# Patient Record
Sex: FEMALE | Race: WHITE | NOT HISPANIC OR LATINO | Employment: FULL TIME | URBAN - METROPOLITAN AREA
[De-identification: names, ages, dates, MRNs, and addresses within clinical notes are randomized per-mention and may not be internally consistent; named-entity substitution may affect disease eponyms.]

---

## 2023-10-31 ENCOUNTER — APPOINTMENT (OUTPATIENT)
Dept: LAB | Facility: CLINIC | Age: 28
End: 2023-10-31
Payer: COMMERCIAL

## 2023-10-31 DIAGNOSIS — N92.6 MISSED MENSES: ICD-10-CM

## 2023-10-31 LAB
ABO GROUP BLD: NORMAL
B-HCG SERPL-ACNC: ABNORMAL MIU/ML (ref 0–5)
BLD GP AB SCN SERPL QL: NEGATIVE
RH BLD: POSITIVE
SPECIMEN EXPIRATION DATE: NORMAL

## 2023-10-31 PROCEDURE — 86901 BLOOD TYPING SEROLOGIC RH(D): CPT

## 2023-10-31 PROCEDURE — 84702 CHORIONIC GONADOTROPIN TEST: CPT

## 2023-10-31 PROCEDURE — 86850 RBC ANTIBODY SCREEN: CPT

## 2023-10-31 PROCEDURE — 36415 COLL VENOUS BLD VENIPUNCTURE: CPT

## 2023-10-31 PROCEDURE — 86900 BLOOD TYPING SEROLOGIC ABO: CPT

## 2023-11-01 ENCOUNTER — TELEPHONE (OUTPATIENT)
Dept: OBGYN CLINIC | Facility: MEDICAL CENTER | Age: 28
End: 2023-11-01

## 2023-11-01 DIAGNOSIS — N92.6 MISSED MENSES: Primary | ICD-10-CM

## 2023-11-01 NOTE — TELEPHONE ENCOUNTER
No appointments available in office that work with patient schedule for dating ultrasound - order placed for central scheduling

## 2023-11-15 ENCOUNTER — HOSPITAL ENCOUNTER (OUTPATIENT)
Dept: ULTRASOUND IMAGING | Facility: HOSPITAL | Age: 28
Discharge: HOME | End: 2023-11-15
Attending: OBSTETRICS & GYNECOLOGY
Payer: COMMERCIAL

## 2023-11-15 DIAGNOSIS — N92.6 MISSED MENSES: ICD-10-CM

## 2023-11-15 PROCEDURE — 76801 OB US < 14 WKS SINGLE FETUS: CPT

## 2023-11-16 ENCOUNTER — TELEPHONE (OUTPATIENT)
Dept: OBGYN CLINIC | Facility: MEDICAL CENTER | Age: 28
End: 2023-11-16

## 2023-11-16 DIAGNOSIS — N92.6 MISSED MENSES: Primary | ICD-10-CM

## 2023-11-16 DIAGNOSIS — Z34.01 ENCOUNTER FOR SUPERVISION OF NORMAL FIRST PREGNANCY IN FIRST TRIMESTER: ICD-10-CM

## 2023-11-28 ENCOUNTER — INITIAL PRENATAL (OUTPATIENT)
Dept: OBGYN CLINIC | Facility: MEDICAL CENTER | Age: 28
End: 2023-11-28

## 2023-11-28 VITALS
BODY MASS INDEX: 25.3 KG/M2 | DIASTOLIC BLOOD PRESSURE: 66 MMHG | HEIGHT: 64 IN | SYSTOLIC BLOOD PRESSURE: 106 MMHG | WEIGHT: 148.2 LBS

## 2023-11-28 DIAGNOSIS — Z34.01 ENCOUNTER FOR SUPERVISION OF NORMAL FIRST PREGNANCY IN FIRST TRIMESTER: Primary | ICD-10-CM

## 2023-11-28 PROCEDURE — NOBC

## 2023-11-28 RX ORDER — CETIRIZINE HYDROCHLORIDE 5 MG/1
5 TABLET ORAL DAILY
COMMUNITY

## 2023-11-28 NOTE — PROGRESS NOTES
OB History    Para Term  AB Living   1 0 0 0 0 0   SAB IAB Ectopic Multiple Live Births   0 0 0 0 0      # Outcome Date GA Lbr Chucho/2nd Weight Sex Delivery Anes PTL Lv   1 Current                  * Pt presents for OB intake  *  *Pt's LMP was Patient's last menstrual period was 2023. *Ultrasound date:11/15/23   8 weeks 1 days  *Estimated date of delivery: 24   * confirmed by LMP    *Signs/Symptoms of Pregnancy   *no Constipation    *no Headaches   *no Cramping  *no Spotting      Diabetes     *no Hx of GDM    *no BMI >35    *no First degree relative with type 2 diabetes    *no Hx of PCOS     Hypertension-    *no Hx of chronic HTN    *no Hx of gestational HTN   *no hx of preeclampsia, eclampsia, or HELLP syndrome     *Infection Screening   *no Does the pt have a hx of MRSA? *no History of herpes? *Immunizations:   *yes Discussed influenza vaccine- Already received it but not in chart. Will bring information to her next visit.    *yes Discussed TDaP vaccine   *yes COVID Vaccine x2    SOCIOECONOMIC:  Mental/Physical/Sexual Abuse  *no  Housing Security  *no  Food Security  *no  Special Needs/Challenges  *no  Substance Use Disorder   ETOH   *no    OPIOD   *no     THC *no    Other: no    ACTIVE MEDICAL/MENTAL HEALTH CONDITIONS  Depression *no   Anxiety*no  Medications*no    *Interview education   *Handouts given:    *[facility] support center     *Lab Locations    * [facility]    *Schedule for Prenatal Visits    *Pregnancy Warning Signs Reviewed    *Safe Medications During Pregnancy    *SMA and CF Testing information sheet    *CPT Code Sheet/MFM 13week NT pamphlet    *[employer]      SBIRT Screen:  Depression Screening Follow-up Plan: Patient's depression screening was negative with an Mattaponi score of 4.        *[facility]   *yes discussed genetic testing- pt interested   Patient has been informed of basic prenatal advice such as avoiding alcohol, drugs, and smoking. She should remain hydrated and take daily prenatal vitamins. Patient should limit caffeine, raw sprouts, high mercury fish, undercooked fish, raw eggs, organ meat, unwashed produce, and unpasteurized cheeses, milk, and fruit juice and undercooked meats. She has been informed about toxoplasmosis and to avoid cat feces. *Details that I feel the provider should be aware of:  Taz Boothe came in for her OB intake at 10 weeks. Patient does not have any current complaints. Prenatal panel ordered. Patient has NT scheduled with [facility] 12/19. SMA and CF information given. Patient will call and let us know if would like for us to place orders. PN1 visit scheduled for *12/14. The patient was oriented to our practice and all questions were answered.     Interviewed by:  Mandy Soriano RN

## 2023-12-06 ENCOUNTER — TELEPHONE (OUTPATIENT)
Dept: OBGYN CLINIC | Facility: MEDICAL CENTER | Age: 28
End: 2023-12-06

## 2023-12-06 DIAGNOSIS — Z13.228 SCREENING FOR CYSTIC FIBROSIS: Primary | ICD-10-CM

## 2023-12-06 DIAGNOSIS — Z34.01 ENCOUNTER FOR SUPERVISION OF NORMAL FIRST PREGNANCY IN FIRST TRIMESTER: ICD-10-CM

## 2023-12-12 ENCOUNTER — APPOINTMENT (OUTPATIENT)
Dept: LAB | Facility: CLINIC | Age: 28
End: 2023-12-12
Payer: COMMERCIAL

## 2023-12-12 DIAGNOSIS — Z34.01 ENCOUNTER FOR SUPERVISION OF NORMAL FIRST PREGNANCY IN FIRST TRIMESTER: ICD-10-CM

## 2023-12-12 LAB
ABO GROUP BLD: NORMAL
BACTERIA UR QL AUTO: ABNORMAL /HPF
BASOPHILS # BLD AUTO: 0.08 THOUSANDS/ÂΜL (ref 0–0.1)
BASOPHILS NFR BLD AUTO: 1 % (ref 0–1)
BILIRUB UR QL STRIP: NEGATIVE
BLD GP AB SCN SERPL QL: NEGATIVE
CLARITY UR: CLEAR
COLOR UR: COLORLESS
EOSINOPHIL # BLD AUTO: 0.15 THOUSAND/ÂΜL (ref 0–0.61)
EOSINOPHIL NFR BLD AUTO: 1 % (ref 0–6)
ERYTHROCYTE [DISTWIDTH] IN BLOOD BY AUTOMATED COUNT: 12.3 % (ref 11.6–15.1)
GLUCOSE UR STRIP-MCNC: NEGATIVE MG/DL
HBV SURFACE AB SER-ACNC: >500 MIU/ML
HBV SURFACE AG SER QL: NORMAL
HCT VFR BLD AUTO: 41 % (ref 34.8–46.1)
HCV AB SER QL: NORMAL
HGB BLD-MCNC: 13.3 G/DL (ref 11.5–15.4)
HGB UR QL STRIP.AUTO: ABNORMAL
HIV 1+2 AB+HIV1 P24 AG SERPL QL IA: NORMAL
HIV 2 AB SERPL QL IA: NORMAL
HIV1 AB SERPL QL IA: NORMAL
HIV1 P24 AG SERPL QL IA: NORMAL
IMM GRANULOCYTES # BLD AUTO: 0.09 THOUSAND/UL (ref 0–0.2)
IMM GRANULOCYTES NFR BLD AUTO: 1 % (ref 0–2)
KETONES UR STRIP-MCNC: NEGATIVE MG/DL
LEUKOCYTE ESTERASE UR QL STRIP: ABNORMAL
LYMPHOCYTES # BLD AUTO: 1.8 THOUSANDS/ÂΜL (ref 0.6–4.47)
LYMPHOCYTES NFR BLD AUTO: 16 % (ref 14–44)
MCH RBC QN AUTO: 30.1 PG (ref 26.8–34.3)
MCHC RBC AUTO-ENTMCNC: 32.4 G/DL (ref 31.4–37.4)
MCV RBC AUTO: 93 FL (ref 82–98)
MONOCYTES # BLD AUTO: 0.7 THOUSAND/ÂΜL (ref 0.17–1.22)
MONOCYTES NFR BLD AUTO: 6 % (ref 4–12)
NEUTROPHILS # BLD AUTO: 8.47 THOUSANDS/ÂΜL (ref 1.85–7.62)
NEUTS SEG NFR BLD AUTO: 75 % (ref 43–75)
NITRITE UR QL STRIP: NEGATIVE
NON-SQ EPI CELLS URNS QL MICRO: ABNORMAL /HPF
NRBC BLD AUTO-RTO: 0 /100 WBCS
PH UR STRIP.AUTO: 6 [PH]
PLATELET # BLD AUTO: 345 THOUSANDS/UL (ref 149–390)
PMV BLD AUTO: 10 FL (ref 8.9–12.7)
PROT UR STRIP-MCNC: NEGATIVE MG/DL
RBC # BLD AUTO: 4.42 MILLION/UL (ref 3.81–5.12)
RBC #/AREA URNS AUTO: ABNORMAL /HPF
RH BLD: POSITIVE
RUBV IGG SERPL IA-ACNC: 45.8 IU/ML
SP GR UR STRIP.AUTO: 1.01 (ref 1–1.03)
SPECIMEN EXPIRATION DATE: NORMAL
TREPONEMA PALLIDUM IGG+IGM AB [PRESENCE] IN SERUM OR PLASMA BY IMMUNOASSAY: NORMAL
UROBILINOGEN UR STRIP-ACNC: <2 MG/DL
WBC # BLD AUTO: 11.29 THOUSAND/UL (ref 4.31–10.16)
WBC #/AREA URNS AUTO: ABNORMAL /HPF

## 2023-12-12 PROCEDURE — 86803 HEPATITIS C AB TEST: CPT

## 2023-12-12 PROCEDURE — 87086 URINE CULTURE/COLONY COUNT: CPT

## 2023-12-12 PROCEDURE — 36415 COLL VENOUS BLD VENIPUNCTURE: CPT

## 2023-12-12 PROCEDURE — 87340 HEPATITIS B SURFACE AG IA: CPT

## 2023-12-12 PROCEDURE — 87389 HIV-1 AG W/HIV-1&-2 AB AG IA: CPT

## 2023-12-12 PROCEDURE — 86780 TREPONEMA PALLIDUM: CPT

## 2023-12-12 PROCEDURE — 86762 RUBELLA ANTIBODY: CPT

## 2023-12-12 PROCEDURE — 86900 BLOOD TYPING SEROLOGIC ABO: CPT

## 2023-12-12 PROCEDURE — 81001 URINALYSIS AUTO W/SCOPE: CPT

## 2023-12-12 PROCEDURE — 86850 RBC ANTIBODY SCREEN: CPT

## 2023-12-12 PROCEDURE — 85025 COMPLETE CBC W/AUTO DIFF WBC: CPT

## 2023-12-12 PROCEDURE — 86901 BLOOD TYPING SEROLOGIC RH(D): CPT

## 2023-12-12 PROCEDURE — 86706 HEP B SURFACE ANTIBODY: CPT

## 2023-12-12 PROCEDURE — 87147 CULTURE TYPE IMMUNOLOGIC: CPT

## 2023-12-13 ENCOUNTER — TELEPHONE (OUTPATIENT)
Dept: OBGYN CLINIC | Facility: MEDICAL CENTER | Age: 28
End: 2023-12-13

## 2023-12-13 PROBLEM — Z3A.12 12 WEEKS GESTATION OF PREGNANCY: Status: ACTIVE | Noted: 2023-12-13

## 2023-12-13 PROBLEM — R16.0 LIVER MASS: Status: RESOLVED | Noted: 2018-11-19 | Resolved: 2023-12-13

## 2023-12-13 PROBLEM — B95.1 POSITIVE GBS TEST: Status: ACTIVE | Noted: 2023-12-13

## 2023-12-13 PROBLEM — Z82.62 FHX: OSTEOPOROSIS: Status: RESOLVED | Noted: 2020-10-02 | Resolved: 2023-12-13

## 2023-12-13 NOTE — TELEPHONE ENCOUNTER
----- Message from Lou Calvin, [facility] sent at 12/13/2023 12:51 PM EST -----  Please call patient blood type is O+, antibody screen is negative, hepatitis B is negative, syphilis screening is negative, hepatitis C is negative, HIV is negative, rubella is immune, hepatitis B surface antibody shows immunity from vaccine, CBC did not show signs of anemia, urine had slight leukocytes and trace blood. Urine culture resulted with 10-19,000 of GBS will need treatment during labor. We will review all of these tomorrow morning at her appointment.     Enck you

## 2023-12-13 NOTE — PROGRESS NOTES
Prenatal H&P     Denies loss of fluid, vaginal discharge, vaginal bleeding  and abdominal pain. Not feeling fetal movement. Tolerating PNV well. Prenatal panel reviewed-WNL except for GBS colonization in urine. Will need treatment in labor. Plans for genetic screening appointment scheduled  center 23. Planned pregnancy     Has been getting hives on legs intermittently. Meets with allergist.  Has noticed hives only appear if she does not take her Zyrtec. OB history:     G1- current     Dating:     LMP - 23 CHERRIE 24     US on 11/15/23 @  8w 1d CHERRIE 24  Working CHERRIE 24     Surgical history: Tonsils and adenoids, frenulectomy and wisdom tooth    Medical History:     Nodular hyperplasia of liver, hemorrhoids and eczema    Social history:     Alcohol/ tobacco/ illicit drug social alcohol use prior to pregnancy/denies drug or tobacco use     Denies history of STD/STI     Work/ housing/ support radiology  SLRA/house-stable/ FOB, family and friends supportive     SAURABH no risk    She denies a hx of recent cough, chills, fever,  STD/STI, denies a personal history  of TB or Zika virus or close contacts with persons with TB or COVID -19. She denies a family history of inheritable conditions such as physical or intellectual disabilities, birth defects, blood disorders, heart or neural tube defects. She has never had MRSA. She denies recent travel or travel planned in the near future. Patient Plans   - Feeding pumping  - Contraception Liletta IUD  - Aware office delivers at [facility]. Reviewed depending on complaint and gestational age may recommend evaluation at [address] [place] [address]:  - Continue prenatal vitamin daily  - Genetic screening/ [facility] appointment scheduled 23  -Encouraged follow-up with allergist if hives continue  -GBS colonization urine will need treatment in labor. No known allergies  -  Weight gain in pregnancy- Who BMI recommend 15-25 lb .  Healthy diet and daily exercise reviewed and encouraged  - Unit regimen reviewed visit every 4 weeks until 28 weeks, every 2 weeks until 36 weeks and then weekly until delivery.    -Pap smear collected. Gonorrhea/chlamydia collected. - Vaccines in Pregnancy      - TDAP vaccine after 27 gestational weeks     - RSV vaccine between 32-36.6 gestational weeks. September- January      - Reviewed with patient  Pregnancy with COVID infection is considered  high risk and can have poor outcome including  delivery, more severe infection. therefore  pregnant patients are recommended to get  COVID-19 vaccination. Written information provided. Had vaccine x 2     - influenza October- March had vaccine in October. Will bring in card to update EMR  -  Common discomforts of pregnancy and precautions reviewed. Signs and symptoms to report reviewed.       RTO 4 weeks

## 2023-12-14 ENCOUNTER — INITIAL PRENATAL (OUTPATIENT)
Dept: OBGYN CLINIC | Facility: MEDICAL CENTER | Age: 28
End: 2023-12-14

## 2023-12-14 VITALS — BODY MASS INDEX: 25.25 KG/M2 | WEIGHT: 147.1 LBS | SYSTOLIC BLOOD PRESSURE: 108 MMHG | DIASTOLIC BLOOD PRESSURE: 62 MMHG

## 2023-12-14 DIAGNOSIS — Z34.91 PRENATAL CARE IN FIRST TRIMESTER: Primary | ICD-10-CM

## 2023-12-14 DIAGNOSIS — Z11.3 ROUTINE SCREENING FOR STI (SEXUALLY TRANSMITTED INFECTION): ICD-10-CM

## 2023-12-14 DIAGNOSIS — B95.1 POSITIVE GBS TEST: ICD-10-CM

## 2023-12-14 DIAGNOSIS — Z3A.12 12 WEEKS GESTATION OF PREGNANCY: ICD-10-CM

## 2023-12-14 DIAGNOSIS — Z12.4 SCREENING FOR CERVICAL CANCER: ICD-10-CM

## 2023-12-14 LAB
BACTERIA UR CULT: ABNORMAL
BACTERIA UR CULT: ABNORMAL

## 2023-12-14 PROCEDURE — G0145 SCR C/V CYTO,THINLAYER,RESCR: HCPCS | Performed by: NURSE PRACTITIONER

## 2023-12-14 PROCEDURE — 87491 CHLMYD TRACH DNA AMP PROBE: CPT | Performed by: NURSE PRACTITIONER

## 2023-12-14 PROCEDURE — 87591 N.GONORRHOEAE DNA AMP PROB: CPT | Performed by: NURSE PRACTITIONER

## 2023-12-14 PROCEDURE — G0476 HPV COMBO ASSAY CA SCREEN: HCPCS | Performed by: NURSE PRACTITIONER

## 2023-12-14 PROCEDURE — PNV: Performed by: NURSE PRACTITIONER

## 2023-12-15 LAB
C TRACH DNA SPEC QL NAA+PROBE: NEGATIVE
N GONORRHOEA DNA SPEC QL NAA+PROBE: NEGATIVE

## 2023-12-18 LAB
HPV HR 12 DNA CVX QL NAA+PROBE: NEGATIVE
HPV16 DNA CVX QL NAA+PROBE: NEGATIVE
HPV18 DNA CVX QL NAA+PROBE: NEGATIVE

## 2023-12-19 ENCOUNTER — ROUTINE PRENATAL (OUTPATIENT)
Age: 28
End: 2023-12-19
Payer: COMMERCIAL

## 2023-12-19 VITALS
HEIGHT: 64 IN | HEART RATE: 87 BPM | SYSTOLIC BLOOD PRESSURE: 106 MMHG | WEIGHT: 148.4 LBS | DIASTOLIC BLOOD PRESSURE: 68 MMHG | BODY MASS INDEX: 25.33 KG/M2

## 2023-12-19 DIAGNOSIS — Z3A.13 13 WEEKS GESTATION OF PREGNANCY: Primary | ICD-10-CM

## 2023-12-19 DIAGNOSIS — Z34.01 ENCOUNTER FOR SUPERVISION OF NORMAL FIRST PREGNANCY IN FIRST TRIMESTER: ICD-10-CM

## 2023-12-19 DIAGNOSIS — Z36.82 NUCHAL TRANSLUCENCY OF FETUS ON PRENATAL ULTRASOUND: ICD-10-CM

## 2023-12-19 PROCEDURE — 99243 OFF/OP CNSLTJ NEW/EST LOW 30: CPT | Performed by: OBSTETRICS & GYNECOLOGY

## 2023-12-19 PROCEDURE — 76813 OB US NUCHAL MEAS 1 GEST: CPT | Performed by: OBSTETRICS & GYNECOLOGY

## 2023-12-19 PROCEDURE — 36415 COLL VENOUS BLD VENIPUNCTURE: CPT | Performed by: OBSTETRICS & GYNECOLOGY

## 2023-12-19 NOTE — PROGRESS NOTES
"Patient chose to have [employer] Non-invasive Prenatal Screen with fetal sex (per pt request).   Patient choose billed through insurance.   Patient assistance program (PAP) application provided to patient yes.  PAP sent with specimen No.     Patient given brochure and is aware [employer] will contact their insurance and coordinate coverage.  Patient made aware she will receive a text message and e-mail from [employer].   Patient informed text/phone call message will come from area code  \"415\".  Provided [employer] Client Services # [phone] and web site at [email or web address].   \"Orange your test online\" card with barcode and test tube ID provided to patient.   Reviewed [employer]'s web site states 5-7 business days for results via their portal.   [employer] message will be sent to patient when [facility] receives results /provider reviews.    2 vials of blood drawn from  right arm by A aSda WATKINS.   Patient tolerated blood draw without difficulty.  Specimens labeled with patient identifiers (name, date of birth, specimen collection date), order and specimen were verified with patient, packed and sent via [employer] lab .  FED EX  tracking #  [id number]  Copy of lab order scanned to Epic media.    Maternal Fetal Medicine will have results in approximately 7-10 business days and will call patient or notify via [facility].  Patient aware viewing lab result online will reveal fetal sex if ordered.    Patient verbalized understanding of all instructions and no questions at this time.    "

## 2023-12-19 NOTE — PROGRESS NOTES
OFFICE CONSULT  Referring physician:   Becca Duke Md  [address] [place] [address]  [address] [place] [address]      Dear Dr. Duke      Thank you for requesting a  consultation on your patient Ms. Eva Garduno for the following indications:  Genetic screening    History  Medications: Zyrtec, Benadryl, epinephrine and prenatal vitamins  Allergies to medications: None  Past medical history: Incidental diagnosis of focal nodular hyperplasia of the liver that is asymptomatic  Past surgical history: Tonsillectomy frenulectomy and wisdom tooth removal  Past obstetrical history:  1 para 0  Social history: Reports no substance use in pregnancy  First generation family history: Her sister and father has hypertension.  Her sister has never been pregnant.    Ultrasound findings:  The ultrasound shows a fetus concordant with dates. The nasal bone and nuchal translucency appears normal. No malformations are seen on today's early ultrasound.     The patient was informed of the findings and counseled about the limitations of the exam in detecting all forms of fetal congenital abnormalities.    She does not report any vaginal bleeding or uterine cramping or contractions.      Specific counseling was provided on the following problems:  We discussed the options for genetic screening which include invasive testing on the fetal placenta or on fetal skin cells within the amniotic fluid and compared this to noninvasive testing which includes cell free DNA screening.  We reviewed the risks, the benefits and the limitations of each.  In the end patient chose to complete the cell free DNA screen.          Future tests recommended:  The results of her NIPT will return in 7-10 days.  Screening for spina bifida with an MSAFP screen is a future test that can be prescribed through her OB office.  This blood work should be drawn preferably at 16 to 18 weeks so that the results return prior to her next scan.  The test  though can be run until 21 weeks and 6 days if needed.    Future ultrasounds ordered today:   Fetal Level II ultrasound imaging is recommended at 19-20 weeks' gestation.    Pre visit time reviewing her records   5 minutes  Face to face time 5 minutes  Post visit time on documentation of note, updating her problem list, adding orders and prescriptions 10 minutes.  Procedures that were completed today were charged separately.   The level of decision making was straight forward.    Zenaida Marcus MD

## 2023-12-19 NOTE — LETTER
2023     Becca Duke MD  [address] [place] [id number]    Patient: Eva Garduno   YOB: 1995   Date of Visit: 2023       Dear Dr. Duke:    Thank you for referring Eva Garduno to me for evaluation. Below are my notes for this consultation.    If you have questions, please do not hesitate to call me. I look forward to following your patient along with you.         Sincerely,        Zenaida Marcus MD        CC: No Recipients    Zenaida Marcus MD  2023  6:49 PM  Sign when Signing Visit  OFFICE CONSULT  Referring physician:   Becca Duke Md  [address] [place] [address]      Dear Dr. Duke      Thank you for requesting a  consultation on your patient Ms. Eva Garduno for the following indications:  Genetic screening    History  Medications: Zyrtec, Benadryl, epinephrine and prenatal vitamins  Allergies to medications: None  Past medical history: Incidental diagnosis of focal nodular hyperplasia of the liver that is asymptomatic  Past surgical history: Tonsillectomy frenulectomy and wisdom tooth removal  Past obstetrical history:  1 para 0  Social history: Reports no substance use in pregnancy  First generation family history: Her sister and father has hypertension.  Her sister has never been pregnant.    Ultrasound findings:  The ultrasound shows a fetus concordant with dates. The nasal bone and nuchal translucency appears normal. No malformations are seen on today's early ultrasound.     The patient was informed of the findings and counseled about the limitations of the exam in detecting all forms of fetal congenital abnormalities.    She does not report any vaginal bleeding or uterine cramping or contractions.      Specific counseling was provided on the following problems:  We discussed the options for genetic screening which include invasive testing on the fetal placenta or on fetal  "skin cells within the amniotic fluid and compared this to noninvasive testing which includes cell free DNA screening.  We reviewed the risks, the benefits and the limitations of each.  In the end patient chose to complete the cell free DNA screen.          Future tests recommended:  The results of her NIPT will return in 7-10 days.  Screening for spina bifida with an MSAFP screen is a future test that can be prescribed through her OB office.  This blood work should be drawn preferably at 16 to 18 weeks so that the results return prior to her next scan.  The test though can be run until 21 weeks and 6 days if needed.    Future ultrasounds ordered today:   Fetal Level II ultrasound imaging is recommended at 19-20 weeks' gestation.    Pre visit time reviewing her records   5 minutes  Face to face time 5 minutes  Post visit time on documentation of note, updating her problem list, adding orders and prescriptions 10 minutes.  Procedures that were completed today were charged separately.   The level of decision making was straight forward.    MD eHrnando Bach  12/19/2023  9:17 AM  Sign when Signing Visit  Patient chose to have [employer] Non-invasive Prenatal Screen with fetal sex (per pt request).   Patient choose billed through insurance.   Patient assistance program (PAP) application provided to patient yes.  PAP sent with specimen No.     Patient given brochure and is aware [employer] will contact their insurance and coordinate coverage.  Patient made aware she will receive a text message and e-mail from [employer].   Patient informed text/phone call message will come from area code  \"415\".  Provided [employer] Client Services # [phone] and web site at [email or web address].   \"[place] your test online\" card with barcode and test tube ID provided to patient.   Reviewed [employer]'s web site states 5-7 business days for results via their portal.   [employer] message will be sent to patient when MFM " receives results /provider reviews.    2 vials of blood drawn from  right arm by FERNANDA Tomlin RN.   Patient tolerated blood draw without difficulty.  Specimens labeled with patient identifiers (name, date of birth, specimen collection date), order and specimen were verified with patient, packed and sent via [employer] lab .  FED EX  tracking #  [id number]  Copy of lab order scanned to Epic media.    Maternal Fetal Medicine will have results in approximately 7-10 business days and will call patient or notify via [employer].  Patient aware viewing lab result online will reveal fetal sex if ordered.    Patient verbalized understanding of all instructions and no questions at this time.

## 2023-12-22 LAB
LAB AP GYN PRIMARY INTERPRETATION: NORMAL
LAB AP LMP: NORMAL
Lab: NORMAL

## 2023-12-26 ENCOUNTER — APPOINTMENT (OUTPATIENT)
Dept: LAB | Facility: CLINIC | Age: 28
End: 2023-12-26
Payer: COMMERCIAL

## 2023-12-26 ENCOUNTER — PATIENT MESSAGE (OUTPATIENT)
Dept: OBGYN CLINIC | Facility: MEDICAL CENTER | Age: 28
End: 2023-12-26

## 2023-12-26 DIAGNOSIS — J30.2 SEASONAL ALLERGIC RHINITIS, UNSPECIFIED TRIGGER: ICD-10-CM

## 2023-12-26 DIAGNOSIS — R39.9 UTI SYMPTOMS: ICD-10-CM

## 2023-12-26 DIAGNOSIS — R39.9 UTI SYMPTOMS: Primary | ICD-10-CM

## 2023-12-26 LAB
BACTERIA UR QL AUTO: ABNORMAL /HPF
BILIRUB UR QL STRIP: NEGATIVE
CLARITY UR: ABNORMAL
COLOR UR: YELLOW
GLUCOSE UR STRIP-MCNC: NEGATIVE MG/DL
HGB UR QL STRIP.AUTO: ABNORMAL
KETONES UR STRIP-MCNC: NEGATIVE MG/DL
LEUKOCYTE ESTERASE UR QL STRIP: ABNORMAL
MUCOUS THREADS UR QL AUTO: ABNORMAL
NITRITE UR QL STRIP: NEGATIVE
NON-SQ EPI CELLS URNS QL MICRO: ABNORMAL /HPF
PH UR STRIP.AUTO: 6 [PH]
PROT UR STRIP-MCNC: ABNORMAL MG/DL
RBC #/AREA URNS AUTO: ABNORMAL /HPF
SP GR UR STRIP.AUTO: 1.03 (ref 1–1.03)
UROBILINOGEN UR STRIP-ACNC: <2 MG/DL
WBC #/AREA URNS AUTO: ABNORMAL /HPF

## 2023-12-26 PROCEDURE — 87086 URINE CULTURE/COLONY COUNT: CPT

## 2023-12-26 PROCEDURE — 81001 URINALYSIS AUTO W/SCOPE: CPT | Performed by: NURSE PRACTITIONER

## 2023-12-27 ENCOUNTER — PATIENT MESSAGE (OUTPATIENT)
Dept: OBGYN CLINIC | Facility: MEDICAL CENTER | Age: 28
End: 2023-12-27

## 2023-12-27 DIAGNOSIS — R39.9 URINARY SYMPTOM OR SIGN: ICD-10-CM

## 2023-12-27 DIAGNOSIS — R39.9 URINARY SYMPTOM OR SIGN: Primary | ICD-10-CM

## 2023-12-27 DIAGNOSIS — Z3A.14 14 WEEKS GESTATION OF PREGNANCY: ICD-10-CM

## 2023-12-27 LAB — BACTERIA UR CULT: NORMAL

## 2023-12-27 RX ORDER — AMOXICILLIN 500 MG/1
500 CAPSULE ORAL EVERY 8 HOURS SCHEDULED
Qty: 15 CAPSULE | Refills: 0 | Status: SHIPPED | OUTPATIENT
Start: 2023-12-27 | End: 2023-12-27 | Stop reason: ALTCHOICE

## 2023-12-29 ENCOUNTER — APPOINTMENT (OUTPATIENT)
Dept: LAB | Facility: CLINIC | Age: 28
End: 2023-12-29
Payer: COMMERCIAL

## 2023-12-29 DIAGNOSIS — Z34.01 ENCOUNTER FOR SUPERVISION OF NORMAL FIRST PREGNANCY IN FIRST TRIMESTER: ICD-10-CM

## 2023-12-29 PROCEDURE — 36415 COLL VENOUS BLD VENIPUNCTURE: CPT

## 2023-12-29 PROCEDURE — 81220 CFTR GENE COM VARIANTS: CPT

## 2023-12-29 PROCEDURE — 81329 SMN1 GENE DOS/DELETION ALYS: CPT

## 2024-01-08 LAB
CITATION REF LAB TEST: NORMAL
CLINICAL INFO: NORMAL
ETHNIC BACKGROUND STATED: NORMAL
GENE DIS ANL CARRIER INTERP-IMP: NORMAL
GENE MUT TESTED BLD/T: NORMAL
LAB DIRECTOR NAME PROVIDER: NORMAL
REASON FOR REFERRAL (NARRATIVE): NORMAL
RECOMMENDATION PATIENT DOC-IMP: NORMAL
REF LAB TEST METHOD: NORMAL
SERVICE CMNT-IMP: NORMAL
SMN1 GENE MUT ANL BLD/T: NORMAL
SPECIMEN SOURCE: NORMAL

## 2024-01-10 LAB
CF COMMENT: NORMAL
CFTR MUT ANL BLD/T: NORMAL

## 2024-01-11 ENCOUNTER — ROUTINE PRENATAL (OUTPATIENT)
Dept: OBGYN CLINIC | Facility: MEDICAL CENTER | Age: 29
End: 2024-01-11

## 2024-01-11 VITALS — SYSTOLIC BLOOD PRESSURE: 108 MMHG | DIASTOLIC BLOOD PRESSURE: 68 MMHG | BODY MASS INDEX: 25.75 KG/M2 | WEIGHT: 150 LBS

## 2024-01-11 DIAGNOSIS — Z3A.16 16 WEEKS GESTATION OF PREGNANCY: ICD-10-CM

## 2024-01-11 DIAGNOSIS — Z34.02 ENCOUNTER FOR SUPERVISION OF LOW-RISK FIRST PREGNANCY IN SECOND TRIMESTER: Primary | ICD-10-CM

## 2024-01-11 PROCEDURE — PNV: Performed by: STUDENT IN AN ORGANIZED HEALTH CARE EDUCATION/TRAINING PROGRAM

## 2024-01-11 NOTE — PROGRESS NOTES
Routine Prenatal Visit  [facility] [address], [place], [place]    Assessment/Plan:  Eva is a 28 y.o. year old  at 16w2d who presents for routine prenatal visit.     1. Encounter for supervision of low-risk first pregnancy in second trimester    2. 16 weeks gestation of pregnancy  -     Alpha fetoprotein, maternal; Future          Subjective:     CC: Prenatal care    Eva Garduno is a 28 y.o.  female who presents for routine prenatal care at 16w2d.  Pregnancy ROS: Denies leakage of fluid, pelvic pain, or vaginal bleeding.      The following portions of the patient's history were reviewed and updated as appropriate: allergies, current medications, past family history, past medical history, obstetric history, gynecologic history, past social history, past surgical history and problem list.      Objective:  /68   Wt 68 kg (150 lb)   LMP 2023   BMI 25.75 kg/m²   Pregravid Weight/BMI: 64.4 kg (142 lb) (BMI 24.36)  Current Weight: 68 kg (150 lb)   Total Weight Gain: 3.629 kg (8 lb)   Pre- Vitals      Flowsheet Row Most Recent Value   Prenatal Assessment    Fetal Heart Rate 154   Movement Absent   Prenatal Vitals    Blood Pressure 108/68   Weight - Scale 68 kg (150 lb)   Urine Albumin/Glucose    Dilation/Effacement/Station    Vaginal Drainage    Draining Fluid No   Edema    LLE Edema None   RLE Edema None   Facial Edema None             General: Well appearing, no distress  Respiratory: Unlabored breathing  Cardiovascular: Regular rate.  Abdomen: Soft, gravid, nontender  Fundal Height: Appropriate for gestational age.  Extremities: Warm and well perfused.  Non tender.

## 2024-01-30 ENCOUNTER — APPOINTMENT (OUTPATIENT)
Dept: LAB | Facility: CLINIC | Age: 29
End: 2024-01-30
Payer: COMMERCIAL

## 2024-01-30 DIAGNOSIS — Z3A.16 16 WEEKS GESTATION OF PREGNANCY: ICD-10-CM

## 2024-01-30 PROCEDURE — 82105 ALPHA-FETOPROTEIN SERUM: CPT

## 2024-01-30 PROCEDURE — 36415 COLL VENOUS BLD VENIPUNCTURE: CPT

## 2024-02-02 LAB
2ND TRIMESTER 4 SCREEN SERPL-IMP: NORMAL
AFP ADJ MOM SERPL: 0.76
AFP INTERP AMN-IMP: NORMAL
AFP INTERP SERPL-IMP: NORMAL
AFP INTERP SERPL-IMP: NORMAL
AFP SERPL-MCNC: 38.7 NG/ML
AGE AT DELIVERY: 31.3 YR
GA METHOD: NORMAL
GA: 19 WEEKS
IDDM PATIENT QL: NO
MULTIPLE PREGNANCY: NO
NEURAL TUBE DEFECT RISK FETUS: NORMAL %

## 2024-02-06 ENCOUNTER — ROUTINE PRENATAL (OUTPATIENT)
Age: 29
End: 2024-02-06
Payer: COMMERCIAL

## 2024-02-06 VITALS
SYSTOLIC BLOOD PRESSURE: 106 MMHG | DIASTOLIC BLOOD PRESSURE: 64 MMHG | HEART RATE: 88 BPM | HEIGHT: 64 IN | WEIGHT: 156 LBS | BODY MASS INDEX: 26.63 KG/M2

## 2024-02-06 DIAGNOSIS — Z3A.20 20 WEEKS GESTATION OF PREGNANCY: Primary | ICD-10-CM

## 2024-02-06 DIAGNOSIS — O44.42 LOW LYING PLACENTA NOS OR WITHOUT HEMORRHAGE, SECOND TRIMESTER: ICD-10-CM

## 2024-02-06 PROCEDURE — 76811 OB US DETAILED SNGL FETUS: CPT | Performed by: OBSTETRICS & GYNECOLOGY

## 2024-02-06 PROCEDURE — 76817 TRANSVAGINAL US OBSTETRIC: CPT | Performed by: OBSTETRICS & GYNECOLOGY

## 2024-02-06 NOTE — LETTER
February 6, 2024     Becca Duke MD  [address] [place] [address]  [address] [place] [address]    Patient: Eva Garduno   YOB: 1995   Date of Visit: 2/6/2024       Dear Dr. Duke:    Thank you for referring Eva Garduno to me for evaluation. Below are my notes for this consultation.    If you have questions, please do not hesitate to call me. I look forward to following your patient along with you.         Sincerely,        James Saavedra MD        CC: No Recipients    James Saavedra MD  2/6/2024  1:00 PM  Sign when Signing Visit  A fetal ultrasound was completed. See Ob procedures in Epic for an interpretation and recommendations. Do not hesitate to contact us in [facility] if you have questions.    James Saavedra MD, MSCE  Maternal Fetal Medicine

## 2024-02-07 ENCOUNTER — ROUTINE PRENATAL (OUTPATIENT)
Dept: OBGYN CLINIC | Facility: MEDICAL CENTER | Age: 29
End: 2024-02-07

## 2024-02-07 VITALS — DIASTOLIC BLOOD PRESSURE: 60 MMHG | WEIGHT: 158.9 LBS | SYSTOLIC BLOOD PRESSURE: 108 MMHG | BODY MASS INDEX: 27.28 KG/M2

## 2024-02-07 DIAGNOSIS — Z3A.20 20 WEEKS GESTATION OF PREGNANCY: ICD-10-CM

## 2024-02-07 DIAGNOSIS — O44.42 LOW LYING PLACENTA NOS OR WITHOUT HEMORRHAGE, SECOND TRIMESTER: Primary | ICD-10-CM

## 2024-02-07 PROCEDURE — PNV: Performed by: NURSE PRACTITIONER

## 2024-02-07 NOTE — PROGRESS NOTES
Denies loss of fluid, vaginal bleeding and abdominal pain.  Confirms frequent fetal movement.  Tolerating prenatal vitamin well.  Is having some upper back/neck soreness from weight of breasts.  Denies other concerns   center ultrasound reviewed-breech presentation, normal-appearing fetal growth, posterior/low-lying placenta, EPI-WNL and EFW 12 ounces.  Recommendation for follow-up at 32 weeks for placental location and growth  BP: 108/60  Wt + 16lbs  Plan   -continue prenatal vitamins daily  -Follow-up with  center scheduled for 24-low-lying placenta.  Precautions reviewed  - upper back/neck soreness encouraged stretching and exercises.  Encouraged to wear well-fitting bra.  If no improvement can offer physical therapy  -Common discomforts of pregnancy and precautions reviewed.  Signs and symptoms to report reviewed.  RTO 4 weeks f/u back/ neck pain

## 2024-03-05 ENCOUNTER — ROUTINE PRENATAL (OUTPATIENT)
Dept: OBGYN CLINIC | Facility: MEDICAL CENTER | Age: 29
End: 2024-03-05

## 2024-03-05 VITALS — SYSTOLIC BLOOD PRESSURE: 110 MMHG | DIASTOLIC BLOOD PRESSURE: 60 MMHG | WEIGHT: 161 LBS | BODY MASS INDEX: 27.64 KG/M2

## 2024-03-05 DIAGNOSIS — Z34.92 SECOND TRIMESTER PREGNANCY: ICD-10-CM

## 2024-03-05 DIAGNOSIS — O44.42 LOW LYING PLACENTA NOS OR WITHOUT HEMORRHAGE, SECOND TRIMESTER: ICD-10-CM

## 2024-03-05 DIAGNOSIS — Z3A.24 24 WEEKS GESTATION OF PREGNANCY: Primary | ICD-10-CM

## 2024-03-05 PROCEDURE — PNV: Performed by: OBSTETRICS & GYNECOLOGY

## 2024-03-05 NOTE — PROGRESS NOTES
Eva is a 29 y.o. year old  at 24w0d for routine prenatal visit.   + FM, no vaginal bleeding, contractions, or LOF  Complaints: No   Some neck discomfort from breast growth in pregnancy - supportive measures discussed    Most recent ultrasound and labs reviewed.  Order for 28 week labs given   Has follow up @ [facility] @  32 weeks - for  low lying posterior placenta

## 2024-03-16 ENCOUNTER — APPOINTMENT (OUTPATIENT)
Dept: LAB | Facility: CLINIC | Age: 29
End: 2024-03-16
Payer: COMMERCIAL

## 2024-03-16 DIAGNOSIS — Z34.92 SECOND TRIMESTER PREGNANCY: ICD-10-CM

## 2024-03-16 LAB
ALBUMIN SERPL BCP-MCNC: 3.8 G/DL (ref 3.5–5)
ALP SERPL-CCNC: 58 U/L (ref 34–104)
ALT SERPL W P-5'-P-CCNC: 17 U/L (ref 7–52)
ANION GAP SERPL CALCULATED.3IONS-SCNC: 8 MMOL/L (ref 4–13)
AST SERPL W P-5'-P-CCNC: 17 U/L (ref 13–39)
BILIRUB SERPL-MCNC: 0.34 MG/DL (ref 0.2–1)
BUN SERPL-MCNC: 9 MG/DL (ref 5–25)
CALCIUM SERPL-MCNC: 9 MG/DL (ref 8.4–10.2)
CHLORIDE SERPL-SCNC: 105 MMOL/L (ref 96–108)
CO2 SERPL-SCNC: 23 MMOL/L (ref 21–32)
CREAT SERPL-MCNC: 0.49 MG/DL (ref 0.6–1.3)
GFR SERPL CREATININE-BSD FRML MDRD: 130 ML/MIN/1.73SQ M
GLUCOSE 1H P 50 G GLC PO SERPL-MCNC: 90 MG/DL (ref 70–134)
GLUCOSE SERPL-MCNC: 88 MG/DL (ref 65–140)
POTASSIUM SERPL-SCNC: 3.5 MMOL/L (ref 3.5–5.3)
PROT SERPL-MCNC: 6.3 G/DL (ref 6.4–8.4)
SODIUM SERPL-SCNC: 136 MMOL/L (ref 135–147)
TREPONEMA PALLIDUM IGG+IGM AB [PRESENCE] IN SERUM OR PLASMA BY IMMUNOASSAY: NORMAL

## 2024-03-16 PROCEDURE — 36415 COLL VENOUS BLD VENIPUNCTURE: CPT

## 2024-03-16 PROCEDURE — 82950 GLUCOSE TEST: CPT | Performed by: OBSTETRICS & GYNECOLOGY

## 2024-03-16 PROCEDURE — 86780 TREPONEMA PALLIDUM: CPT

## 2024-03-19 ENCOUNTER — TELEPHONE (OUTPATIENT)
Dept: INTERNAL MEDICINE CLINIC | Facility: CLINIC | Age: 29
End: 2024-03-19

## 2024-03-19 NOTE — TELEPHONE ENCOUNTER
----- Message from Obed Gage DO sent at 3/18/2024 12:50 PM EDT -----  BW was completed and came to PCP office.  Vitamin D is a bit low at 25, please advise Eva to discuss these results with her OB/Gyn doctor.  thanks

## 2024-04-01 ENCOUNTER — NURSE TRIAGE (OUTPATIENT)
Age: 29
End: 2024-04-01

## 2024-04-01 ENCOUNTER — HOSPITAL ENCOUNTER (OUTPATIENT)
Facility: HOSPITAL | Age: 29
Discharge: HOME | End: 2024-04-01
Attending: OBSTETRICS & GYNECOLOGY | Admitting: OBSTETRICS & GYNECOLOGY
Payer: COMMERCIAL

## 2024-04-01 VITALS — SYSTOLIC BLOOD PRESSURE: 110 MMHG | DIASTOLIC BLOOD PRESSURE: 69 MMHG

## 2024-04-01 PROBLEM — O36.8120 DECREASED FETAL MOVEMENTS IN SECOND TRIMESTER: Status: ACTIVE | Noted: 2024-04-01

## 2024-04-01 PROBLEM — Z3A.27 27 WEEKS GESTATION OF PREGNANCY: Status: ACTIVE | Noted: 2023-12-13

## 2024-04-01 PROCEDURE — NC001 PR NO CHARGE: Performed by: OBSTETRICS & GYNECOLOGY

## 2024-04-01 PROCEDURE — 76815 OB US LIMITED FETUS(S): CPT | Performed by: OBSTETRICS & GYNECOLOGY

## 2024-04-01 PROCEDURE — 76815 OB US LIMITED FETUS(S): CPT

## 2024-04-01 PROCEDURE — 99203 OFFICE O/P NEW LOW 30 MIN: CPT

## 2024-04-01 NOTE — PROGRESS NOTES
L&D Triage Note - OB/GYN  Eva Garduno 29 y.o. female MRN: [id number]  Unit/Bed#:  TRIAGE 3-01 Encounter: [id number]    Patient is seen by [facility]    ASSESSMENT  Eva Garduno is a 29 y.o.  at 27w6d presenting for decreased fetal movement with return of normal fetal movement in triage    PLAN  #1. Decreased fetal movement:  NST appropriate for gestational age  Dooling with no contractions  EPI 6.31 cm cm    Discharge instructions  Patient instructed to call if experiencing worsening contractions, vaginal bleeding, loss of fluid or decreased fetal movement.  Will follow up with OBGYN on 24.    D/w Dr. Altamirano  ______________    SUBJECTIVE    CHERRIE: Estimated Date of Delivery: 24    HPI:  29 y.o.  27w6d presents with complaint of decreased fetal movement.  While in L&D triage, she had return of normal fetal movement after experiencing decreased fetal movement all morning.  She denies cramping/contractions, leaking fluid, or vaginal bleeding.    Her obstetrical history is significant for GBS positive status, low-lying placenta    ROS:  Constitutional: Negative  Respiratory: Negative  Cardiovascular: Negative    Gastrointestinal: Negative    OBJECTIVE:    Vitals:  /69   LMP 2023   There is no height or weight on file to calculate BMI.    Physical Exam  Constitutional:       General: She is not in acute distress.     Appearance: Normal appearance. She is not ill-appearing.   HENT:      Mouth/Throat:      Mouth: Mucous membranes are moist.   Eyes:      Extraocular Movements: Extraocular movements intact.   Cardiovascular:      Rate and Rhythm: Normal rate.   Pulmonary:      Effort: Pulmonary effort is normal.   Abdominal:      General: There is no distension.      Palpations: Abdomen is soft.      Tenderness: There is no abdominal tenderness. There is no guarding.   Musculoskeletal:         General: No swelling or tenderness.      Right lower leg: No edema.      Left lower leg: No edema.    Skin:     General: Skin is warm and dry.      Coloration: Skin is not jaundiced.   Neurological:      General: No focal deficit present.      Mental Status: She is alert.   Psychiatric:         Mood and Affect: Mood normal.         Behavior: Behavior normal.         Thought Content: Thought content normal.         Judgment: Judgment normal.       SVE:  Declined    FHT:  Baseline: 145 bpm  Variability: moderate  Accelerations: present  Decelerations: absent  NST appropriate for gestational age    TOCO:   No contractions    IMAGING:      TAUS   EPI      - LVP 6.31 cm   Placenta: posterior   Presentation: cephalic    Labs: No results found for this or any previous visit (from the past 24 hour(s)).        Gabriela Parks MD  4/1/2024  1:35 PM

## 2024-04-01 NOTE — TELEPHONE ENCOUNTER
"Pt called in reporting decreased fetal movement today. Pt reports she last felt baby move around 0700 and it was maybe one kick. States she had lunch about 1.5 hours ago which normally makes baby move and she has felt nothing. Denies abdominal pain, vaginal bleeding or leakage of fluid. Recommended pt be evaluated in L&D and pt is agreeable.     Reason for Disposition   Pregnant 23 or more weeks and baby moving less today by kick count (e.g., kick count < 5 in 1 hour or < 10 in 2 hours)    Answer Assessment - Initial Assessment Questions  1. FETAL MOVEMENT: \"Has the baby's movement decreased or changed significantly from normal?\" (e.g., yes, no; describe)      Yes, only felt one kick around 0700  2. CHERRIE: \"What date are you expecting to deliver?\"       6/25/24  3. PREGNANCY: \"How many weeks pregnant are you?\"       27w6d  4. OTHER SYMPTOMS: \"Do you have any other symptoms?\" (e.g., abdominal pain, leaking fluid from vagina, vaginal bleeding, etc.)      denies    Protocols used: Pregnancy - Decreased Fetal Movement-ADULT-[place]    "

## 2024-04-01 NOTE — PROCEDURES
Eva Garduno, a  at 27w6d with an CHERRIE of 2024, by Last Menstrual Period, was seen at [facility] for the following procedure(s): $Procedure Type: EPI]         4 Quadrant EPI  LVP (cm): 6.3 cm  Cephalic presentation  Posterior placenta                   Gabriela Parks MD  OBGYN Resident  24  2:30 PM

## 2024-04-02 ENCOUNTER — ROUTINE PRENATAL (OUTPATIENT)
Dept: OBGYN CLINIC | Facility: MEDICAL CENTER | Age: 29
End: 2024-04-02
Payer: COMMERCIAL

## 2024-04-02 VITALS — SYSTOLIC BLOOD PRESSURE: 100 MMHG | WEIGHT: 169 LBS | DIASTOLIC BLOOD PRESSURE: 70 MMHG | BODY MASS INDEX: 29.01 KG/M2

## 2024-04-02 DIAGNOSIS — Z23 ENCOUNTER FOR IMMUNIZATION: ICD-10-CM

## 2024-04-02 DIAGNOSIS — B95.1 POSITIVE GBS TEST: ICD-10-CM

## 2024-04-02 DIAGNOSIS — Z3A.28 28 WEEKS GESTATION OF PREGNANCY: ICD-10-CM

## 2024-04-02 DIAGNOSIS — Z34.03 ENCOUNTER FOR SUPERVISION OF NORMAL FIRST PREGNANCY IN THIRD TRIMESTER: Primary | ICD-10-CM

## 2024-04-02 DIAGNOSIS — O44.42 LOW LYING PLACENTA NOS OR WITHOUT HEMORRHAGE, SECOND TRIMESTER: ICD-10-CM

## 2024-04-02 PROCEDURE — 90471 IMMUNIZATION ADMIN: CPT | Performed by: OBSTETRICS & GYNECOLOGY

## 2024-04-02 PROCEDURE — 90715 TDAP VACCINE 7 YRS/> IM: CPT | Performed by: OBSTETRICS & GYNECOLOGY

## 2024-04-02 PROCEDURE — PNV: Performed by: OBSTETRICS & GYNECOLOGY

## 2024-04-02 NOTE — PROGRESS NOTES
Routine Prenatal Visit  [facility] [address], [place], [place]    Assessment/Plan:  Eva is a 29 y.o. year old  at 28w0d who presents for routine prenatal visit.     1. Encounter for supervision of normal first pregnancy in third trimester    2. Encounter for immunization  -     Tdap Vaccine greater than or equal to 6yo    3. Low lying placenta nos or without hemorrhage, second trimester    4. 28 weeks gestation of pregnancy    5. Positive GBS test          Subjective:     CC: Prenatal care    Eva Garduno is a 29 y.o.  female who presents for routine prenatal care at 28w0d.  Pregnancy ROS: no leakage of fluid, pelvic pain, or vaginal bleeding.  + fetal movement.  - PTL precautions  - C teaching  - Birth plan given, peds list given, birth consent signed  - 28 wk labs reviewed  - Tdap given, recommendations reviewed for family vaccination  - Rhogam not indicated  - breast pump ordered    The following portions of the patient's history were reviewed and updated as appropriate: allergies, current medications, past family history, past medical history, obstetric history, gynecologic history, past social history, past surgical history and problem list.      Objective:  /70   Wt 76.7 kg (169 lb)   LMP 2023   BMI 29.01 kg/m²   Pregravid Weight/BMI: 64.4 kg (142 lb) (BMI 24.36)  Current Weight: 76.7 kg (169 lb)   Total Weight Gain: 12.2 kg (27 lb)   Pre-Tenisha Vitals      Flowsheet Row Most Recent Value   Prenatal Assessment    Fetal Heart Rate 159   Movement Present   Prenatal Vitals    Blood Pressure 100/70   Weight - Scale 76.7 kg (169 lb)   Urine Albumin/Glucose    Dilation/Effacement/Station    Vaginal Drainage    Draining Fluid No   Edema    LLE Edema None   RLE Edema Trace   Facial Edema None             General: Well appearing, no distress  Respiratory: Unlabored breathing  Cardiovascular: Regular rate.  Abdomen: Soft, gravid, nontender  Fundal  Height: Appropriate for gestational age.  Extremities: Warm and well perfused.  Non tender.

## 2024-04-03 LAB
DME PARACHUTE DELIVERY DATE REQUESTED: NORMAL
DME PARACHUTE ITEM DESCRIPTION: NORMAL
DME PARACHUTE ORDER STATUS: NORMAL
DME PARACHUTE SUPPLIER NAME: NORMAL
DME PARACHUTE SUPPLIER PHONE: NORMAL

## 2024-04-17 ENCOUNTER — ROUTINE PRENATAL (OUTPATIENT)
Dept: OBGYN CLINIC | Facility: MEDICAL CENTER | Age: 29
End: 2024-04-17

## 2024-04-17 VITALS
SYSTOLIC BLOOD PRESSURE: 116 MMHG | HEIGHT: 64 IN | DIASTOLIC BLOOD PRESSURE: 70 MMHG | BODY MASS INDEX: 29.79 KG/M2 | WEIGHT: 174.5 LBS

## 2024-04-17 DIAGNOSIS — B95.1 POSITIVE GBS TEST: ICD-10-CM

## 2024-04-17 DIAGNOSIS — Z3A.30 30 WEEKS GESTATION OF PREGNANCY: Primary | ICD-10-CM

## 2024-04-17 DIAGNOSIS — O44.42 LOW LYING PLACENTA NOS OR WITHOUT HEMORRHAGE, SECOND TRIMESTER: ICD-10-CM

## 2024-04-17 DIAGNOSIS — O36.8120 DECREASED FETAL MOVEMENTS IN SECOND TRIMESTER, SINGLE OR UNSPECIFIED FETUS: ICD-10-CM

## 2024-04-17 PROCEDURE — PNV: Performed by: OBSTETRICS & GYNECOLOGY

## 2024-04-17 NOTE — PROGRESS NOTES
"Routine Prenatal Visit  [facility] [address], [place], [place]    Assessment/Plan:  Eva is a 29 y.o. year old  at 30w1d who presents for routine prenatal visit.     1. 30 weeks gestation of pregnancy  Assessment & Plan:  NIPT / AFP low risk       2. Low lying placenta nos or without hemorrhage, second trimester  Assessment & Plan:  1.1 away       3. Positive GBS test    4. Decreased fetal movements in second trimester, single or unspecified fetus  Assessment & Plan:  Resolved when in triage   Report today             Subjective:     CC: Prenatal care    Eva Garduno is a 29 y.o.  female who presents for routine prenatal care at 30w1d.  Pregnancy ROS: no leakage of fluid, pelvic pain, or vaginal bleeding.  Yes  fetal movement.    The following portions of the patient's history were reviewed and updated as appropriate: allergies, current medications, past family history, past medical history, obstetric history, gynecologic history, past social history, past surgical history and problem list.      Objective:  /70   Ht 5' 4\" (1.626 m)   Wt 79.2 kg (174 lb 8 oz)   LMP 2023   BMI 29.95 kg/m²   Pregravid Weight/BMI: 64.4 kg (142 lb) (BMI 24.36)  Current Weight: 79.2 kg (174 lb 8 oz)   Total Weight Gain: 14.7 kg (32 lb 8 oz)   Pre- Vitals      Flowsheet Row Most Recent Value   Prenatal Assessment    Fetal Heart Rate 156   Movement Present   Prenatal Vitals    Blood Pressure 116/70   Weight - Scale 79.2 kg (174 lb 8 oz)   Urine Albumin/Glucose    Dilation/Effacement/Station    Vaginal Drainage    Edema    LLE Edema Trace   RLE Edema Trace   Facial Edema None             General: Well appearing, no distress  Respiratory: Unlabored breathing  Cardiovascular: Regular rate.  Abdomen: Soft, gravid, nontender  Fundal Height: Appropriate for gestational age.  Extremities: Warm and well perfused.  Non tender.    "

## 2024-04-26 PROBLEM — O44.43 LOW LYING PLACENTA NOS OR WITHOUT HEMORRHAGE, THIRD TRIMESTER: Status: ACTIVE | Noted: 2024-02-06

## 2024-04-29 NOTE — PATIENT INSTRUCTIONS
Please go to Labor and Delivery now for evaluation.  The address of [facility] is [address] [place], [place] [id number] (B[facility] Center is on the 3rd floor).

## 2024-04-30 ENCOUNTER — HOSPITAL ENCOUNTER (OUTPATIENT)
Facility: HOSPITAL | Age: 29
Discharge: HOME | End: 2024-04-30
Attending: OBSTETRICS & GYNECOLOGY | Admitting: OBSTETRICS & GYNECOLOGY
Payer: COMMERCIAL

## 2024-04-30 ENCOUNTER — ULTRASOUND (OUTPATIENT)
Age: 29
End: 2024-04-30
Payer: COMMERCIAL

## 2024-04-30 VITALS
WEIGHT: 181.2 LBS | SYSTOLIC BLOOD PRESSURE: 124 MMHG | HEART RATE: 76 BPM | BODY MASS INDEX: 30.93 KG/M2 | DIASTOLIC BLOOD PRESSURE: 86 MMHG | HEIGHT: 64 IN

## 2024-04-30 VITALS — HEART RATE: 63 BPM | DIASTOLIC BLOOD PRESSURE: 79 MMHG | SYSTOLIC BLOOD PRESSURE: 134 MMHG

## 2024-04-30 DIAGNOSIS — O44.43 LOW LYING PLACENTA NOS OR WITHOUT HEMORRHAGE, THIRD TRIMESTER: ICD-10-CM

## 2024-04-30 DIAGNOSIS — Z36.89 ENCOUNTER FOR ULTRASOUND TO CHECK FETAL GROWTH: ICD-10-CM

## 2024-04-30 DIAGNOSIS — Z3A.32 32 WEEKS GESTATION OF PREGNANCY: Primary | ICD-10-CM

## 2024-04-30 DIAGNOSIS — O36.5990 FETAL GROWTH RESTRICTION ANTEPARTUM: Primary | ICD-10-CM

## 2024-04-30 PROBLEM — R80.9 PROTEINURIA: Status: ACTIVE | Noted: 2024-04-30

## 2024-04-30 LAB
CREAT UR-MCNC: 48 MG/DL
ERYTHROCYTE [DISTWIDTH] IN BLOOD BY AUTOMATED COUNT: 12.7 % (ref 11.6–15.1)
HCT VFR BLD AUTO: 34.6 % (ref 34.8–46.1)
HGB BLD-MCNC: 11.7 G/DL (ref 11.5–15.4)
MCH RBC QN AUTO: 30.5 PG (ref 26.8–34.3)
MCHC RBC AUTO-ENTMCNC: 33.8 G/DL (ref 31.4–37.4)
MCV RBC AUTO: 90 FL (ref 82–98)
PLATELET # BLD AUTO: 282 THOUSANDS/UL (ref 149–390)
PMV BLD AUTO: 10.7 FL (ref 8.9–12.7)
PROT UR-MCNC: 15 MG/DL
PROT/CREAT UR: 0.31 MG/G{CREAT} (ref 0–0.1)
RBC # BLD AUTO: 3.84 MILLION/UL (ref 3.81–5.12)
WBC # BLD AUTO: 12.63 THOUSAND/UL (ref 4.31–10.16)

## 2024-04-30 PROCEDURE — 76817 TRANSVAGINAL US OBSTETRIC: CPT | Performed by: OBSTETRICS & GYNECOLOGY

## 2024-04-30 PROCEDURE — 99213 OFFICE O/P EST LOW 20 MIN: CPT

## 2024-04-30 PROCEDURE — 99214 OFFICE O/P EST MOD 30 MIN: CPT | Performed by: OBSTETRICS & GYNECOLOGY

## 2024-04-30 PROCEDURE — NC001 PR NO CHARGE: Performed by: OBSTETRICS & GYNECOLOGY

## 2024-04-30 PROCEDURE — 84156 ASSAY OF PROTEIN URINE: CPT

## 2024-04-30 PROCEDURE — 82570 ASSAY OF URINE CREATININE: CPT

## 2024-04-30 PROCEDURE — 76819 FETAL BIOPHYS PROFIL W/O NST: CPT | Performed by: OBSTETRICS & GYNECOLOGY

## 2024-04-30 PROCEDURE — 80053 COMPREHEN METABOLIC PANEL: CPT

## 2024-04-30 PROCEDURE — 76816 OB US FOLLOW-UP PER FETUS: CPT | Performed by: OBSTETRICS & GYNECOLOGY

## 2024-04-30 PROCEDURE — 76820 UMBILICAL ARTERY ECHO: CPT | Performed by: OBSTETRICS & GYNECOLOGY

## 2024-04-30 PROCEDURE — 85027 COMPLETE CBC AUTOMATED: CPT

## 2024-04-30 RX ORDER — CALCIUM CARBONATE 500 MG/1
1 TABLET, CHEWABLE ORAL 2 TIMES DAILY PRN
Start: 2024-04-30

## 2024-04-30 RX ORDER — ACETAMINOPHEN 325 MG/1
650 TABLET ORAL EVERY 6 HOURS PRN
Start: 2024-04-30

## 2024-04-30 NOTE — PROGRESS NOTES
L&D Triage Note - OB/GYN  Eva Garduno 29 y.o. female MRN: [id number]  Unit/Bed#: LD TRIAGE 3-01 Encounter: [id number]      ASSESSMENT:    Eva Garduno is a 29 y.o.  at 32w0d presenting for evaluation of  upper abdominal discomfort and 6/8 BPP at [facility] U/S. Elevated protein creatinine ratio. CBC, CMP and blood pressures within normal limits. No obstetric complaints. Pt stable for discharge with outpatient follow up scheduled for tomorrow     PLAN:    1) r/o preE, HELLP  - Normotensive in triage (130s/70-80s)  - Platelets 282k  - Creatinine 0.51  - AST/ALT 15/12  - U P:C ratio 0.31  - Discussed diagnosis of elevated urine protein creatinine ratio. Reviewed that if she were to develop elevated blood pressures more than 4 hours apart over the coming weeks that she would meet criteria for at least preeclampsia without severe features. Reviewed warning signs and symptoms of preeclampsia  - Reviewed tums and pepcid can be helpful for her GERD complaints. Discussed small frequent meals and no lying down after eating may help with her symptoms.     2) Fetal monitoring  - Reactive NST in triage  - no contractions present  - BPP 6/8 at [facility] office      Discharge Instructions:   Continue routine prenatal care  Discharge from OB triage with  labor precautions    - Reviewed rupture of membranes, false vs true labor, decreased fetal movement, and vaginal bleeding   - Pt to call provider with any concerns and follow up at her next scheduled prenatal appointment on  at 1615 hrs.   - Case discussed with Dr. Ramirez    SUBJECTIVE:    Eva Garduno 29 y.o.  at 32w0d with an Estimated Date of Delivery: 24     Presenting for evaluation of epigastric pain associated with meals. She has been suffering from GERD symptoms throughout the past few weeks of her pregnancy. She has been taking Tums PRN, but has only some relief symptoms. No changes in appetite. Reports pain only after eating.  She is not having any  contrations, lower abdominal pain, leakage of fluid of vaginal bleeding. She reports good fetal movement.     Her current obstetrical history is significant for possible asymmetric FGR , GBS positive status    PMHx of incidental diagnosis of focal nodular hyperplasia of the liver that is asymptomatic     Reviewed most recent U/S 24 at [facility] prior to evaluation  MEASUREMENTS (* Included In Average GA)     AC             26.51 cm        30 weeks 4 days* (13%)  BPD             8.03 cm        32 weeks 1 day * (49%)  HC             28.53 cm        31 weeks 2 days* (6%)  Femur           5.74 cm        30 weeks 1 day * (4%)     HC/AC           1.08 [0.96 - 1.17]                 (60%)  FL/AC             22 [20 - 24]  FL/BPD            71 [71 - 87]  EFW Hadlock 4   1612 grams - 3 lbs 9 oz                 (9%)     THE AVERAGE GESTATIONAL AGE is 31 weeks 1 day +/- 21 days.    SUPINE  Cervical Length: 3.69 cm     ANATOMY COMMENTS     Fetal anatomy has been previously assessed and documented in our Center.  No fetal structural abnormality is identified on the Level I survey today.  Follow up evaluation of intracranial anatomy (calvarium and  lateral  ventricles), cardiac anatomy (four chamber, outflow tracts, and three  vessel view), diaphragm, stomach, kidneys, and bladder appear normal.  The placenta is no longer low lying and measure 4.45cm from the internal  os.     BIOPHYSICAL PROFILE     The Biophysical Profile score was 6/8.  Breathin  Movement: 2  Tone: 2  AFV: 2     IMPRESSION     Acosta IUP  31 weeks and 1 day by this ultrasound. (CHERRIE=2024)  32 weeks and 0 days by LMP. (CHERRIE=2024)  Vertex presentation  Growth assessment indicated possible asymmetric FGR  Regular fetal heart rate of 158 bpm  Posterior placenta  No placenta previa     GENERAL COMMENT     -Anatomic views reveal no fetal malformation within the limits of  ultrasound at this gestational age.  -The EFW is consistent with the   percentile for this gestational age  consistent with fetal growth restriction therefore umbilical artery  Doppler studies were performed and are within normal limits without  absence or reversal of flow.  -Amniotic fluid volume is within normal limits.  -Transvaginal ultrasound was performed in conjunction with a  transabdominal ultrasound to better visualize the cervix.  Previously seen  low-lying placenta has resolved, without concern for Vasa previa.  Incidental transvaginal cervical length measures 36.9mm which is not  shortened.     AMNIOTIC FLUID     Q1: 2.2      Q2: 2.8      Q3: 4.5      Q4: 3.5  EPI Total = 13.0 cm  Amniotic Fluid: Normal    OBJECTIVE:    Vitals:    04/30/24 1700   BP: 134/79   Pulse: 63       ROS:  Constitutional: Negative  Respiratory: Negative  Cardiovascular: Negative    Gastrointestinal: Negative    General Physical Exam:  General: Well appearing, no distress  Respiratory: Unlabored breathing  Cardiovascular: Regular rate.  Abdomen: Soft, gravid, nontender  Fundal Height: Appropriate for gestational age.  Extremities: Warm and well perfused.  Non tender.    Fetal monitoring:    FHT:   Baseline Rate (FHR): 130 bpm  Variability: Moderate  Accelerations: 15 x 15 or greater  Decelerations: None    TOCO:   Contraction Frequency (minutes): 0  Contraction Duration (seconds): 0    Labs  WCB & ANC     Results from last 7 days   Lab Units 04/30/24  1715   WBC Thousand/uL 12.63*   , Hgb / Hct       Results from last 7 days   Lab Units 04/30/24  1715   HEMOGLOBIN g/dL 11.7   HEMATOCRIT % 34.6*   , Platelet        Results from last 7 days   Lab Units 04/30/24  1715   PLATELETS Thousands/uL 282   , Renal      Results from last 7 days   Lab Units 04/30/24  1715   BUN mg/dL 9   CREATININE mg/dL 0.51*   EGFR ml/min/1.73sq m 128   , or LFT's       Results from last 7 days   Lab Units 04/30/24  1715   AST U/L 15   ALT U/L 12   ALK PHOS U/L 71   TOTAL PROTEIN g/dL 5.8*   ALBUMIN g/dL 3.5   TOTAL BILIRUBIN  mg/dL 0.23         Garrison Camp DO, OBGYN PGY-2  4/30/2024 5:26 PM

## 2024-04-30 NOTE — LETTER
"   Date: 2024    Becca Duke MD  [address] [place] [address]    Patient: Eva Garduno   YOB: 1995   Date of Visit: 2024   Gestational age 32w1d   Nature of this communication: Routine though please note I sent her to the hospital and she has new FGR       This patient was seen recently in our  office.  Please see ultrasound report under \"OB Procedures\" tab.  Please don't hesitate to contact our office with any concerns or questions.      Sincerely,      Monica Clatyon MD  Attending Physician, Maternal-Fetal Medicine  [facility]      "

## 2024-04-30 NOTE — PROGRESS NOTES
Ultrasound Probe Disinfection    A transvaginal ultrasound was performed.   Prior to use, disinfection was performed with High Level Disinfection Process ([employer]).  Probe serial number S1: [id number] was used.      Daisy Diaz  04/30/24  3:35 PM

## 2024-05-01 ENCOUNTER — ROUTINE PRENATAL (OUTPATIENT)
Dept: OBGYN CLINIC | Facility: MEDICAL CENTER | Age: 29
End: 2024-05-01

## 2024-05-01 VITALS — BODY MASS INDEX: 30.9 KG/M2 | WEIGHT: 180 LBS | DIASTOLIC BLOOD PRESSURE: 82 MMHG | SYSTOLIC BLOOD PRESSURE: 116 MMHG

## 2024-05-01 DIAGNOSIS — O99.613 GASTROESOPHAGEAL REFLUX IN PREGNANCY IN THIRD TRIMESTER: Primary | ICD-10-CM

## 2024-05-01 DIAGNOSIS — Z3A.30 30 WEEKS GESTATION OF PREGNANCY: ICD-10-CM

## 2024-05-01 DIAGNOSIS — B95.1 POSITIVE GBS TEST: ICD-10-CM

## 2024-05-01 DIAGNOSIS — K21.9 GASTROESOPHAGEAL REFLUX IN PREGNANCY IN THIRD TRIMESTER: Primary | ICD-10-CM

## 2024-05-01 PROCEDURE — PNV: Performed by: NURSE PRACTITIONER

## 2024-05-01 RX ORDER — FAMOTIDINE 20 MG/1
20 TABLET, FILM COATED ORAL 2 TIMES DAILY
Qty: 60 TABLET | Refills: 1 | Status: SHIPPED | OUTPATIENT
Start: 2024-05-01

## 2024-05-01 NOTE — LETTER
Date: 5/1/2024    To whom it may concern:     This is to certify that Eva Garduno has been under my care for the following diagnosis: Pregnancy due date 6/25/24        I feel that Eva Garduno is unable to serve on Jury Duty at this time for the above mentioned medical reasons.                  Sincerely,  ANDERS Strickland

## 2024-05-01 NOTE — LETTER
Date: 5/1/2024    To whom it may concern:     This is to certify that Eva Garduno has been under my care for the following diagnosis: Pregnancy due date 6/25/24        I feel that Eva Garduno # [id number] is unable to serve on Jury Duty at this time for the above mentioned medical reasons.                  Sincerely,  ANDERS Strickland

## 2024-05-01 NOTE — PROGRESS NOTES
"[facility]: Ms. Garduno was seen today for followup placental location ultrasound with fetal growth measurement.  See ultrasound report under \"OB Procedures\" tab.      MDM:   I. Diagnoses/Problems addressed:  Undiagnosed new problem(s) with uncertain prognosis: new FGR  III.  Risk of morbidity: Moderate (dispositioned to hospital)    Please don't hesitate to contact our office with any concerns or questions.  -Monica Clayton MD      "

## 2024-05-01 NOTE — PROGRESS NOTES
Denies loss of fluid, vaginal bleeding and abdominal pain.  Confirms frequent fetal movement.  Doing fetal kick counts.  Tolerating prenatal vitamin well.  Has been taking Tums without significant improvement in heartburn.  Denies other questions or concerns at today's visit.  Requesting note for jury duty.      center ultrasound reviewed- 24-Vertex presentation, possible asymmetric FGR, posterior placenta, no placenta previa, EPI-WNL and EFW 1612g/3 pounds 9 ounces (9%).  Recommendation for follow-up growth scan in 3 weeks and weekly  fetal surveillance  BP: 116/82 weight: +38lbs  Plan  -Continue prenatal vitamins daily  -Continue fetal kick counts daily  - possible asymmetric FGR Follow-up with  center scheduled for 24  -Jury duty note provided  -RX pepcid 20 mg BID for reflux.  Reviewed dietary recommendations.  -Common discomforts of pregnancy and precautions including  labor reviewed.  Signs and symptoms to report reviewed.  RTO 2 weeks

## 2024-05-08 ENCOUNTER — ULTRASOUND (OUTPATIENT)
Dept: PERINATAL CARE | Facility: CLINIC | Age: 29
End: 2024-05-08
Payer: COMMERCIAL

## 2024-05-08 ENCOUNTER — HOSPITAL ENCOUNTER (OUTPATIENT)
Facility: HOSPITAL | Age: 29
Setting detail: OBSERVATION
Discharge: HOME | End: 2024-05-09
Attending: OBSTETRICS & GYNECOLOGY | Admitting: OBSTETRICS & GYNECOLOGY
Payer: COMMERCIAL

## 2024-05-08 VITALS
SYSTOLIC BLOOD PRESSURE: 116 MMHG | BODY MASS INDEX: 31.34 KG/M2 | HEIGHT: 64 IN | HEART RATE: 76 BPM | WEIGHT: 183.6 LBS | DIASTOLIC BLOOD PRESSURE: 80 MMHG

## 2024-05-08 DIAGNOSIS — R51.9 HEADACHE IN PREGNANCY, ANTEPARTUM, THIRD TRIMESTER: Primary | ICD-10-CM

## 2024-05-08 DIAGNOSIS — O12.13: ICD-10-CM

## 2024-05-08 DIAGNOSIS — R51.9 HEADACHE IN PREGNANCY, ANTEPARTUM, THIRD TRIMESTER: ICD-10-CM

## 2024-05-08 DIAGNOSIS — O26.893 HEADACHE IN PREGNANCY, ANTEPARTUM, THIRD TRIMESTER: Primary | ICD-10-CM

## 2024-05-08 DIAGNOSIS — O36.5990 FETAL GROWTH RESTRICTION ANTEPARTUM: Primary | ICD-10-CM

## 2024-05-08 DIAGNOSIS — O26.893 HEADACHE IN PREGNANCY, ANTEPARTUM, THIRD TRIMESTER: ICD-10-CM

## 2024-05-08 DIAGNOSIS — Z3A.32 32 WEEKS GESTATION OF PREGNANCY: ICD-10-CM

## 2024-05-08 PROBLEM — O13.9 GESTATIONAL HYPERTENSION: Status: ACTIVE | Noted: 2024-05-08

## 2024-05-08 PROBLEM — Z3A.33 33 WEEKS GESTATION OF PREGNANCY: Status: ACTIVE | Noted: 2023-12-13

## 2024-05-08 PROBLEM — O14.90 PREECLAMPSIA: Status: ACTIVE | Noted: 2024-05-08

## 2024-05-08 LAB
ALBUMIN SERPL BCP-MCNC: 3.5 G/DL (ref 3.5–5)
ALBUMIN SERPL BCP-MCNC: 3.7 G/DL (ref 3.5–5)
ALP SERPL-CCNC: 102 U/L (ref 34–104)
ALP SERPL-CCNC: 71 U/L (ref 34–104)
ALT SERPL W P-5'-P-CCNC: 12 U/L (ref 7–52)
ALT SERPL W P-5'-P-CCNC: 13 U/L (ref 7–52)
ANION GAP SERPL CALCULATED.3IONS-SCNC: 8 MMOL/L (ref 4–13)
ANION GAP SERPL CALCULATED.3IONS-SCNC: 8 MMOL/L (ref 4–13)
AST SERPL W P-5'-P-CCNC: 15 U/L (ref 13–39)
AST SERPL W P-5'-P-CCNC: 18 U/L (ref 13–39)
BASOPHILS # BLD AUTO: 0.05 THOUSANDS/ÂΜL (ref 0–0.1)
BASOPHILS NFR BLD AUTO: 0 % (ref 0–1)
BILIRUB SERPL-MCNC: 0.23 MG/DL (ref 0.2–1)
BILIRUB SERPL-MCNC: 0.34 MG/DL (ref 0.2–1)
BUN SERPL-MCNC: 14 MG/DL (ref 5–25)
BUN SERPL-MCNC: 9 MG/DL (ref 5–25)
CALCIUM SERPL-MCNC: 9 MG/DL (ref 8.4–10.2)
CALCIUM SERPL-MCNC: 9.4 MG/DL (ref 8.4–10.2)
CHLORIDE SERPL-SCNC: 105 MMOL/L (ref 96–108)
CHLORIDE SERPL-SCNC: 106 MMOL/L (ref 96–108)
CO2 SERPL-SCNC: 22 MMOL/L (ref 21–32)
CO2 SERPL-SCNC: 22 MMOL/L (ref 21–32)
CREAT SERPL-MCNC: 0.51 MG/DL (ref 0.6–1.3)
CREAT SERPL-MCNC: 0.6 MG/DL (ref 0.6–1.3)
CREAT UR-MCNC: 67.6 MG/DL
EOSINOPHIL # BLD AUTO: 0.11 THOUSAND/ÂΜL (ref 0–0.61)
EOSINOPHIL NFR BLD AUTO: 1 % (ref 0–6)
ERYTHROCYTE [DISTWIDTH] IN BLOOD BY AUTOMATED COUNT: 12.8 % (ref 11.6–15.1)
GFR SERPL CREATININE-BSD FRML MDRD: 121 ML/MIN/1.73SQ M
GFR SERPL CREATININE-BSD FRML MDRD: 128 ML/MIN/1.73SQ M
GLUCOSE SERPL-MCNC: 73 MG/DL (ref 65–140)
GLUCOSE SERPL-MCNC: 75 MG/DL (ref 65–140)
HCT VFR BLD AUTO: 38.5 % (ref 34.8–46.1)
HGB BLD-MCNC: 13 G/DL (ref 11.5–15.4)
IMM GRANULOCYTES # BLD AUTO: 0.09 THOUSAND/UL (ref 0–0.2)
IMM GRANULOCYTES NFR BLD AUTO: 1 % (ref 0–2)
LYMPHOCYTES # BLD AUTO: 2.3 THOUSANDS/ÂΜL (ref 0.6–4.47)
LYMPHOCYTES NFR BLD AUTO: 17 % (ref 14–44)
MCH RBC QN AUTO: 30.7 PG (ref 26.8–34.3)
MCHC RBC AUTO-ENTMCNC: 33.8 G/DL (ref 31.4–37.4)
MCV RBC AUTO: 91 FL (ref 82–98)
MONOCYTES # BLD AUTO: 0.82 THOUSAND/ÂΜL (ref 0.17–1.22)
MONOCYTES NFR BLD AUTO: 6 % (ref 4–12)
NEUTROPHILS # BLD AUTO: 10.07 THOUSANDS/ÂΜL (ref 1.85–7.62)
NEUTS SEG NFR BLD AUTO: 75 % (ref 43–75)
NRBC BLD AUTO-RTO: 0 /100 WBCS
PLATELET # BLD AUTO: 289 THOUSANDS/UL (ref 149–390)
PMV BLD AUTO: 11.3 FL (ref 8.9–12.7)
POTASSIUM SERPL-SCNC: 4 MMOL/L (ref 3.5–5.3)
POTASSIUM SERPL-SCNC: 4.2 MMOL/L (ref 3.5–5.3)
PROT SERPL-MCNC: 5.8 G/DL (ref 6.4–8.4)
PROT SERPL-MCNC: 6.4 G/DL (ref 6.4–8.4)
PROT UR-MCNC: 46 MG/DL
PROT/CREAT UR: 0.68 MG/G{CREAT} (ref 0–0.1)
RBC # BLD AUTO: 4.23 MILLION/UL (ref 3.81–5.12)
SODIUM SERPL-SCNC: 135 MMOL/L (ref 135–147)
SODIUM SERPL-SCNC: 136 MMOL/L (ref 135–147)
WBC # BLD AUTO: 13.44 THOUSAND/UL (ref 4.31–10.16)

## 2024-05-08 PROCEDURE — 99214 OFFICE O/P EST MOD 30 MIN: CPT | Performed by: OBSTETRICS & GYNECOLOGY

## 2024-05-08 PROCEDURE — 80053 COMPREHEN METABOLIC PANEL: CPT | Performed by: NURSE PRACTITIONER

## 2024-05-08 PROCEDURE — 76815 OB US LIMITED FETUS(S): CPT | Performed by: OBSTETRICS & GYNECOLOGY

## 2024-05-08 PROCEDURE — 85025 COMPLETE CBC W/AUTO DIFF WBC: CPT | Performed by: NURSE PRACTITIONER

## 2024-05-08 PROCEDURE — 76820 UMBILICAL ARTERY ECHO: CPT | Performed by: OBSTETRICS & GYNECOLOGY

## 2024-05-08 PROCEDURE — NC001 PR NO CHARGE: Performed by: NURSE PRACTITIONER

## 2024-05-08 PROCEDURE — 59025 FETAL NON-STRESS TEST: CPT | Performed by: OBSTETRICS & GYNECOLOGY

## 2024-05-08 PROCEDURE — 84156 ASSAY OF PROTEIN URINE: CPT | Performed by: NURSE PRACTITIONER

## 2024-05-08 PROCEDURE — G0379 DIRECT REFER HOSPITAL OBSERV: HCPCS

## 2024-05-08 PROCEDURE — 82570 ASSAY OF URINE CREATININE: CPT | Performed by: NURSE PRACTITIONER

## 2024-05-08 PROCEDURE — 99223 1ST HOSP IP/OBS HIGH 75: CPT | Performed by: OBSTETRICS & GYNECOLOGY

## 2024-05-08 RX ORDER — FAMOTIDINE 20 MG/1
20 TABLET, FILM COATED ORAL 2 TIMES DAILY
Status: DISCONTINUED | OUTPATIENT
Start: 2024-05-08 | End: 2024-05-09 | Stop reason: HOSPADM

## 2024-05-08 RX ORDER — METOCLOPRAMIDE HYDROCHLORIDE 5 MG/ML
10 INJECTION INTRAMUSCULAR; INTRAVENOUS EVERY 6 HOURS PRN
Status: DISCONTINUED | OUTPATIENT
Start: 2024-05-08 | End: 2024-05-08

## 2024-05-08 RX ORDER — CALCIUM CARBONATE 500 MG/1
1000 TABLET, CHEWABLE ORAL DAILY PRN
Status: DISCONTINUED | OUTPATIENT
Start: 2024-05-08 | End: 2024-05-09 | Stop reason: HOSPADM

## 2024-05-08 RX ORDER — METOCLOPRAMIDE 10 MG/1
10 TABLET ORAL
Status: DISCONTINUED | OUTPATIENT
Start: 2024-05-08 | End: 2024-05-09 | Stop reason: HOSPADM

## 2024-05-08 RX ORDER — DIPHENHYDRAMINE HCL 25 MG
25 TABLET ORAL EVERY 6 HOURS PRN
Status: DISCONTINUED | OUTPATIENT
Start: 2024-05-08 | End: 2024-05-09 | Stop reason: HOSPADM

## 2024-05-08 RX ORDER — DIPHENHYDRAMINE HYDROCHLORIDE 50 MG/ML
25 INJECTION INTRAMUSCULAR; INTRAVENOUS EVERY 6 HOURS PRN
Status: DISCONTINUED | OUTPATIENT
Start: 2024-05-08 | End: 2024-05-08

## 2024-05-08 RX ORDER — ACETAMINOPHEN 325 MG/1
650 TABLET ORAL EVERY 6 HOURS PRN
Status: DISCONTINUED | OUTPATIENT
Start: 2024-05-08 | End: 2024-05-09 | Stop reason: HOSPADM

## 2024-05-08 RX ORDER — SIMETHICONE 80 MG
80 TABLET,CHEWABLE ORAL 4 TIMES DAILY PRN
Status: DISCONTINUED | OUTPATIENT
Start: 2024-05-08 | End: 2024-05-09 | Stop reason: HOSPADM

## 2024-05-08 RX ORDER — BETAMETHASONE SODIUM PHOSPHATE AND BETAMETHASONE ACETATE 3; 3 MG/ML; MG/ML
12 INJECTION, SUSPENSION INTRA-ARTICULAR; INTRALESIONAL; INTRAMUSCULAR; SOFT TISSUE EVERY 24 HOURS
Status: DISCONTINUED | OUTPATIENT
Start: 2024-05-08 | End: 2024-05-09 | Stop reason: HOSPADM

## 2024-05-08 RX ADMIN — BETAMETHASONE SODIUM PHOSPHATE AND BETAMETHASONE ACETATE 12 MG: 3; 3 INJECTION, SUSPENSION INTRA-ARTICULAR; INTRALESIONAL; INTRAMUSCULAR at 17:25

## 2024-05-08 RX ADMIN — DIPHENHYDRAMINE HYDROCHLORIDE 25 MG: 25 TABLET ORAL at 16:08

## 2024-05-08 RX ADMIN — METOCLOPRAMIDE 10 MG: 10 TABLET ORAL at 16:08

## 2024-05-08 RX ADMIN — ACETAMINOPHEN 650 MG: 325 TABLET, FILM COATED ORAL at 18:39

## 2024-05-08 RX ADMIN — ACETAMINOPHEN 650 MG: 325 TABLET, FILM COATED ORAL at 12:04

## 2024-05-08 RX ADMIN — FAMOTIDINE 20 MG: 20 TABLET, FILM COATED ORAL at 17:25

## 2024-05-08 NOTE — PROGRESS NOTES
L&D Triage Note - OB/GYN  Eva Garduno 29 y.o. female MRN: [id number]  Unit/Bed#: -01 Encounter: [id number]      Assessment:  29 y.o.  at 33w1d     Plan:  1. NST - reactive/ reassuring  2. ONEAL relieved with Tylenol not resolved  3. Labs  protein/ creatinine ration 0.68 ( 0.31 on 24); CMP - WNL; CBC - reviewed   4.  Will continue to monitor blood pressures for at least 4 hours to confirm diagnosis .       D/W  Dr. Lopez     ______________________________________________________________________      Chief Compliant: sent from [facility] ( ANFS for FGR) with HA and elevated BP    TIME: 1308  Subjective:  29 y.o.  at 33w1d sent from [facility] appointment with elevated BP and HA. HA for the past 2 days relieved but not resolved with Tylenol. -130s/ 50-90s.  Denies LOF, VB and abdominal pain. Confirms frequent fetal movement.       Objective:  Vitals:    24 1252   BP: 126/88   Pulse: 68   Resp:    Temp:        SVE: NA  FHT:  135 / Moderate 6 - 25 bpm / + accelerations,  Cat 1  [place]: q Quiet           ANDERS Strickland 2024 1:07 PM

## 2024-05-08 NOTE — H&P
H & P- Obstetrics   Eva Garduno 29 y.o. female MRN: [id number]  Unit/Bed#: -01 Encounter: [id number]      Assessment/Plan:    Eva is a 29 y.o.  at 33w1d admitted for observation in the setting of newly diagnosed preeclampsia without severe features and a persistent headache    Preeclampsia  Assessment & Plan  Patient presented labor and delivery in the setting of a headache and was found to have elevated blood pressures and an elevated protein to creatinine ratio (0.68)  Patient now meets criteria for preeclampsia questionably with severe features in the setting of headache not responsive to treatment  CBC/CMP within normal limits  Systolic (24hrs), Av , Min:112 , Max:149   Diastolic (24hrs), Av, Min:57, Max:99    Continue to monitor blood pressures          Fetal growth restriction antepartum  Assessment & Plan  As of 24  MEASUREMENTS (* Included In Average GA)     AC             26.51 cm        30 weeks 4 days* (13%)  BPD             8.03 cm        32 weeks 1 day * (49%)  HC             28.53 cm        31 weeks 2 days* (6%)  Femur           5.74 cm        30 weeks 1 day * (4%)     HC/AC           1.08 [0.96 - 1.17]                 (60%)  FL/AC             22 [20 - 24]  FL/BPD            71 [71 - 87]  EFW Hadlock 4   1612 grams - 3 lbs 9 oz                 (9%)     THE AVERAGE GESTATIONAL AGE is 31 weeks 1 day +/- 21 days.      33 weeks gestation of pregnancy  Assessment & Plan  NST TID  Vertex by TAUS  Give BTM fetal lung maturity in the setting of possible need for delivery    * Headache in pregnancy, antepartum, third trimester  Assessment & Plan  Status post Tylenol,without improvement in headache  Given PO Reglan/Benadryl  May consider Solu-Medrol if headache persists  If headache remains unresponsive to medical treatment consider further workup (neuroimaging etc.)           [facility] of: [facility]  This patient will be an OBSERVATION and I certify the anticipated  length of stay is <2 Midnights  Discussed with Dr. Lopez      SUBJECTIVE:    Chief Complaint: sent over from [facility] appointment for headache and elevated BP    HPI: Eva Garduno is a 29 y.o.  with an CHERRIE of 2024, by Last Menstrual Period at 33w1d who is being admitted for preeclampsia without severe features.  She went to [facility] today and was found to have a persistent headache with elevated blood pressures.  Upon presentation to triage she met criteria for preeclampsia without severe features.  She continues to have a headache and is being admitted for betamethasone, and continued headache medical management.  If headache does not resolved could be considered as severe feature of preeclampsia.  She denies having uterine contractions, has no LOF, and reports no VB. She states she has felt good FM. This pregnancy is complicated by GBS positive status, proteinuria, FGR. All other review of systems is negative.       Pregnancy Plan:  Pregnancy: Acosta  Fetal sex: Female  Support person: Floyd     Delivery Plans  Planned delivery method: Vaginal  Planned delivery location: [facility] L[facility]  Acceptable blood products: All     Post-Delivery Plans  Feeding intentions: Breast Milk      Patient Active Problem List   Diagnosis    Focal nodular hyperplasia of liver    Allergic rhinitis    Hemorrhoids    Positive GBS test    33 weeks gestation of pregnancy    Decreased fetal movements in second trimester    Fetal growth restriction antepartum    Proteinuria    Gestational proteinuria without hypertension during pregnancy in third trimester    Headache in pregnancy, antepartum, third trimester    Preeclampsia       OB History    Para Term  AB Living   1 0 0 0 0 0   SAB IAB Ectopic Multiple Live Births   0 0 0 0 0      # Outcome Date GA Lbr Chucho/2nd Weight Sex Delivery Anes PTL Lv   1 Current                Past Medical History:   Diagnosis Date    Eczema     Liver cyst        Past Surgical History:   Procedure  "Laterality Date    FRENULECTOMY, LINGUAL      AL TONSILLECTOMY & ADENOIDECTOMY AGE 10/> N/A 10/3/2018    Procedure: TONSILLECTOMY;  Surgeon: Bradley Leon MD;  Location: [facility];  Service: ENT    WISDOM TOOTH EXTRACTION         Social History     Tobacco Use    Smoking status: Never    Smokeless tobacco: Never   Substance Use Topics    Alcohol use: Not Currently     Comment: socially       No Known Allergies    Medications Prior to Admission   Medication    acetaminophen (TYLENOL) 325 mg tablet    calcium carbonate (TUMS) 500 mg chewable tablet    cetirizine (ZyrTEC) 5 MG tablet    diphenhydrAMINE HCl (BENADRYL ALLERGY PO)    Prenatal Vit-Fe Fumarate-FA (PRENATAL VITAMIN PO)    EPINEPHrine (EPIPEN) 0.3 mg/0.3 mL SOAJ    famotidine (PEPCID) 20 mg tablet           OBJECTIVE:  Vitals:  Temp:  [98.1 °F (36.7 °C)] 98.1 °F (36.7 °C)  HR:  [62-88] 74  Resp:  [18] 18  BP: (112-149)/(57-99) 135/82  Body mass index is 31.52 kg/m².     Physical Exam:  General: Well appearing, no distress  Respiratory: Unlabored breathing  Cardiovascular: Regular rate.  Abdomen: Soft, gravid, nontender  Fundal Height: Appropriate for gestational age.  Extremities: Warm and well perfused.  Non tender.  Psychiatric: Behavioral normal        FHT:  Baseline Rate (FHR): 135 bpm  Variability: Moderate  Accelerations: 15 x 15 or greater    TOCO:   Contraction Frequency (minutes): 0  Contraction Duration (seconds): 0      Prenatal Labs: Blood Type:   Lab Results   Component Value Date/Time    ABO Grouping O 12/12/2023 09:47 AM     , D (Rh type):   Lab Results   Component Value Date/Time    Rh Factor Positive 12/12/2023 09:47 AM     , Antibody Screen: No results found for: \"ANTIBODYSCR\" , HCT/HGB:   Lab Results   Component Value Date/Time    Hematocrit 38.5 05/08/2024 12:08 PM    Hemoglobin 13.0 05/08/2024 12:08 PM      , Platelets:   Lab Results   Component Value Date/Time    Platelets 289 05/08/2024 12:08 PM      , 1 hour Glucola:   Lab Results " "  Component Value Date/Time    Glucose 90 03/16/2024 08:36 AM   , Varicella: No results found for: \"VARICELLAIGG\"    , Rubella:   Lab Results   Component Value Date/Time    Rubella IgG Quant 45.8 12/12/2023 09:47 AM        , VDRL/RPR: No results found for: \"RPR\"   , Urine Culture/Screen:   Lab Results   Component Value Date/Time    Urine Culture No Growth <1000 cfu/mL 12/26/2023 08:05 AM       , Hep B:   Lab Results   Component Value Date/Time    Hepatitis B Surface Ag Non-reactive 12/12/2023 09:47 AM     , Hep C: No components found for: \"HEPCSAG\", \"EXTHEPCSAG\"   , HIV: No results found for: \"HIVAGAB\"  , Chlamydia: No results found for: \"EXTCHLAMYDIA\"  , Gonorrhea:   Lab Results   Component Value Date/Time    N gonorrhoeae, DNA Probe Negative 12/14/2023 09:05 AM     , Group B Strep:  No results found for: \"STREPGRPB\"   ,       [place] DO Nuria  5/8/2024  5:20 PM        Portions of the record may have been created with voice recognition software.  Occasional wrong word or \"sound a like\" substitutions may have occurred due to the inherent limitations of voice recognition software.  Read the chart carefully and recognize, using context, where substitutions have occurred    "

## 2024-05-08 NOTE — LETTER
"May 8, 2024    Mariella Lopez MD  [address] [facility]  [address]  [place] [id number]    Patient: Eva Garduno   YOB: 1995   Date of Visit: 2024   Gestational age 33w1d   Nature of this communication: Urgent: patient to L&D for evaluation of severe headache       Dear colleagues,    This patient was seen recently in our  office.  The content of my evaluation today is in the ultrasound report under \"OB Procedures\" tab.     Please don't hesitate to contact our office with any concerns or questions.     Sincerely,      Vivian Pradhan MD  Attending Physician, Maternal-Fetal Medicine  [facility]      "

## 2024-05-08 NOTE — ASSESSMENT & PLAN NOTE
Patient presented labor and delivery in the setting of a headache and was found to have elevated blood pressures and an elevated protein to creatinine ratio (0.68)  Patient now meets criteria for preeclampsia, no severe features at this time  CBC/CMP within normal limits, however plts decreasing and Cr increasing. Plan to repeat labs when patient returns for BMTZ this evening   Systolic (24hrs), Av , Min:112 , Max:149   Diastolic (24hrs), Av, Min:57, Max:99    Continue to monitor blood pressures

## 2024-05-08 NOTE — ASSESSMENT & PLAN NOTE
Status post Tylenol,without improvement in headache  Given PO Reglan/Benadryl  Headache improved after oral medication  Continue taking oral medication at home

## 2024-05-08 NOTE — PATIENT INSTRUCTIONS
Please go to Labor and Delivery now for evaluation.  The address of [facility] is [address] [place], [place] [phone] ([facility] Center is on the 3rd floor).

## 2024-05-08 NOTE — ASSESSMENT & PLAN NOTE
As of 4/30/24  MEASUREMENTS (* Included In Average GA)     AC             26.51 cm        30 weeks 4 days* (13%)  BPD             8.03 cm        32 weeks 1 day * (49%)  HC             28.53 cm        31 weeks 2 days* (6%)  Femur           5.74 cm        30 weeks 1 day * (4%)     HC/AC           1.08 [0.96 - 1.17]                 (60%)  FL/AC             22 [20 - 24]  FL/BPD            71 [71 - 87]  EFW Hadlock 4   1612 grams - 3 lbs 9 oz                 (9%)     THE AVERAGE GESTATIONAL AGE is 31 weeks 1 day +/- 21 days.

## 2024-05-08 NOTE — QUICK NOTE
Discussed Eva Garduno with Dr. Lopez by phone. Eva is a  at 33w1d with preeclampsia and FGR with normal umbilical artery doppler indices who has a new headache today. Lab work is notable for proteinuria (UPC 0.68), HELLP labs are otherwise within normal limits. Fetal monitoring is reassuring per Dr. Lopez. Eva's headache has improved from an 8/10 to 4/10 with treatment, but warrants ongoing observation. I recommended serial blood pressure monitoring and repeat HELLP labs tomorrow morning to see how her headache responds. If headache is persistent or severe, further evaluation including neuroimaging (at least MRI/MRV) along with neurology consultation would be advisable. If headache is unexplained by an alternate diagnosis, it should be considered a severe feature of preeclampsia, which would warrant magnesium sulfate therapy and delivery by 34w0d (potentially sooner). In the absence of severe preeclampsia, delivery is recommended between 34-37 weeks gestation for fetal growth restriction with concurrent preeclampsia. Given the potential that this may represent severe preeclampsia,  corticosteroids are advised. Neonatology should be consulted if  delivery is planned.

## 2024-05-08 NOTE — PROGRESS NOTES
Non-Stress Testing:    Non-Stress test, equipment, procedure, and expected outcomes explained. Reviewed fetal kick counts and when to call OB.Verified patient understanding of fetal kick counts with teach back method. Patient reports feeling daily fetal movements.     Patient c/o of headache - Pain scale of 6/10 for the past 3 unrelieved with Tylenol.  Also c/o of floaters as well. Denies any epigastric pain. Informed patient that Dr. Pradhan will be made of current status, however she should call her OB as  well. Patient verbalized understanding.    Dr. Pradhan viewed NST strip prior to completion of visit

## 2024-05-09 ENCOUNTER — HOSPITAL ENCOUNTER (OUTPATIENT)
Facility: HOSPITAL | Age: 29
Discharge: HOME | End: 2024-05-09
Attending: OBSTETRICS & GYNECOLOGY | Admitting: OBSTETRICS & GYNECOLOGY
Payer: COMMERCIAL

## 2024-05-09 VITALS
BODY MASS INDEX: 31.35 KG/M2 | TEMPERATURE: 97.5 F | WEIGHT: 183.64 LBS | SYSTOLIC BLOOD PRESSURE: 135 MMHG | RESPIRATION RATE: 16 BRPM | HEIGHT: 64 IN | DIASTOLIC BLOOD PRESSURE: 91 MMHG | HEART RATE: 56 BPM

## 2024-05-09 LAB
ALBUMIN SERPL BCP-MCNC: 3.8 G/DL (ref 3.5–5)
ALP SERPL-CCNC: 98 U/L (ref 34–104)
ALT SERPL W P-5'-P-CCNC: 16 U/L (ref 7–52)
ANION GAP SERPL CALCULATED.3IONS-SCNC: 8 MMOL/L (ref 4–13)
AST SERPL W P-5'-P-CCNC: 30 U/L (ref 13–39)
BILIRUB SERPL-MCNC: 0.35 MG/DL (ref 0.2–1)
BUN SERPL-MCNC: 15 MG/DL (ref 5–25)
CALCIUM SERPL-MCNC: 8.9 MG/DL (ref 8.4–10.2)
CHLORIDE SERPL-SCNC: 108 MMOL/L (ref 96–108)
CO2 SERPL-SCNC: 17 MMOL/L (ref 21–32)
CREAT SERPL-MCNC: 0.65 MG/DL (ref 0.6–1.3)
ERYTHROCYTE [DISTWIDTH] IN BLOOD BY AUTOMATED COUNT: 12.7 % (ref 11.6–15.1)
GFR SERPL CREATININE-BSD FRML MDRD: 118 ML/MIN/1.73SQ M
GLUCOSE P FAST SERPL-MCNC: 107 MG/DL (ref 65–99)
GLUCOSE SERPL-MCNC: 107 MG/DL (ref 65–140)
HCT VFR BLD AUTO: 41.8 % (ref 34.8–46.1)
HGB BLD-MCNC: 13.9 G/DL (ref 11.5–15.4)
MCH RBC QN AUTO: 31 PG (ref 26.8–34.3)
MCHC RBC AUTO-ENTMCNC: 33.3 G/DL (ref 31.4–37.4)
MCV RBC AUTO: 93 FL (ref 82–98)
PLATELET # BLD AUTO: 241 THOUSANDS/UL (ref 149–390)
PMV BLD AUTO: 11.3 FL (ref 8.9–12.7)
POTASSIUM SERPL-SCNC: 5.2 MMOL/L (ref 3.5–5.3)
PROT SERPL-MCNC: 6.5 G/DL (ref 6.4–8.4)
RBC # BLD AUTO: 4.49 MILLION/UL (ref 3.81–5.12)
SODIUM SERPL-SCNC: 133 MMOL/L (ref 135–147)
WBC # BLD AUTO: 17.57 THOUSAND/UL (ref 4.31–10.16)

## 2024-05-09 PROCEDURE — 80053 COMPREHEN METABOLIC PANEL: CPT

## 2024-05-09 PROCEDURE — 99232 SBSQ HOSP IP/OBS MODERATE 35: CPT | Performed by: OBSTETRICS & GYNECOLOGY

## 2024-05-09 PROCEDURE — 99211 OFF/OP EST MAY X REQ PHY/QHP: CPT

## 2024-05-09 PROCEDURE — 85027 COMPLETE CBC AUTOMATED: CPT

## 2024-05-09 PROCEDURE — 99214 OFFICE O/P EST MOD 30 MIN: CPT

## 2024-05-09 RX ORDER — BETAMETHASONE SODIUM PHOSPHATE AND BETAMETHASONE ACETATE 3; 3 MG/ML; MG/ML
12 INJECTION, SUSPENSION INTRA-ARTICULAR; INTRALESIONAL; INTRAMUSCULAR; SOFT TISSUE ONCE
Status: COMPLETED | OUTPATIENT
Start: 2024-05-09 | End: 2024-05-09

## 2024-05-09 RX ORDER — METOCLOPRAMIDE 10 MG/1
10 TABLET ORAL
Qty: 30 TABLET | Refills: 0 | Status: SHIPPED | OUTPATIENT
Start: 2024-05-09 | End: 2024-05-18

## 2024-05-09 RX ADMIN — BETAMETHASONE SODIUM PHOSPHATE AND BETAMETHASONE ACETATE 12 MG: 3; 3 INJECTION, SUSPENSION INTRA-ARTICULAR; INTRALESIONAL; INTRAMUSCULAR at 17:31

## 2024-05-09 RX ADMIN — METOCLOPRAMIDE 10 MG: 10 TABLET ORAL at 07:42

## 2024-05-09 RX ADMIN — ACETAMINOPHEN 650 MG: 325 TABLET, FILM COATED ORAL at 03:08

## 2024-05-09 NOTE — PROGRESS NOTES
Progress Note - Ob/Gyn  Eva Garduno 29 y.o. female MRN: [id number]  Unit/Bed#: -01 Encounter: [id number]  Admit date: 2024.  Today's date: 24    Assessment/Plan     Ms. Garduno is a 29 y.o.  at 33w2d, Hospital Day: 2, admitted with Pre-E with concern for evolving severe features in the setting of having a headache.  By issue:    Preeclampsia  Assessment & Plan  Patient presented labor and delivery in the setting of a headache and was found to have elevated blood pressures and an elevated protein to creatinine ratio (0.68)  Patient now meets criteria for preeclampsia, no severe features at this time  CBC/CMP within normal limits, however plts decreasing and Cr increasing. Plan to repeat labs when patient returns for BMTZ this evening   Systolic (24hrs), Av , Min:112 , Max:149   Diastolic (24hrs), Av, Min:57, Max:99    Continue to monitor blood pressures    Fetal growth restriction antepartum  Assessment & Plan  As of 24  MEASUREMENTS (* Included In Average GA)     AC             26.51 cm        30 weeks 4 days* (13%)  BPD             8.03 cm        32 weeks 1 day * (49%)  HC             28.53 cm        31 weeks 2 days* (6%)  Femur           5.74 cm        30 weeks 1 day * (4%)     HC/AC           1.08 [0.96 - 1.17]                 (60%)  FL/AC             22 [20 - 24]  FL/BPD            71 [71 - 87]  EFW Hadlock 4   1612 grams - 3 lbs 9 oz                 (9%)     THE AVERAGE GESTATIONAL AGE is 31 weeks 1 day +/- 21 days.      33 weeks gestation of pregnancy  Assessment & Plan  NST TID  Vertex by CHRISTINE  BMTZ x1, to receive second dose at 6 pm today    * Headache in pregnancy, antepartum, third trimester  Assessment & Plan  Status post Tylenol,without improvement in headache  Given PO Reglan/Benadryl  Headache improved after oral medication  Continue taking oral medication at home            Subjective    Contractions: no  Loss of fluid: no  Vaginal bleeding: no  Fetal movement:  "yes    Pain: no  Headaches: no  Visual changes: no  Chest pain: no  Shortness of breath: no  Nausea: no  Vomiting/Diarrhea: no  Dysuria: no  Leg pain: no          Objective      Patient Vitals for the past 24 hrs:   BP Temp Temp src Pulse Resp Height Weight   05/09/24 0706 135/91 97.5 °F (36.4 °C) Temporal 56 16 -- --   05/09/24 0308 127/79 -- -- 88 16 -- --   05/08/24 2300 128/80 -- -- 82 18 -- --   05/08/24 1939 125/83 -- -- 81 -- -- --   05/08/24 1838 128/82 -- -- 71 -- -- --   05/08/24 1738 125/84 -- -- 77 -- -- --   05/08/24 1723 119/79 -- -- 81 -- -- --   05/08/24 1708 135/82 -- -- 74 -- 5' 4\" (1.626 m) 83.3 kg (183 lb 10.3 oz)   05/08/24 1652 128/87 -- -- 70 -- -- --   05/08/24 1637 120/86 -- -- 83 -- -- --   05/08/24 1622 132/87 -- -- 84 -- -- --   05/08/24 1607 123/84 -- -- 80 -- -- --   05/08/24 1552 112/79 -- -- 88 -- -- --   05/08/24 1537 129/85 -- -- 81 -- -- --   05/08/24 1522 135/86 -- -- 80 -- -- --   05/08/24 1507 131/88 -- -- 74 -- -- --   05/08/24 1452 147/97 -- -- 74 -- -- --   05/08/24 1437 127/87 -- -- 73 -- -- --   05/08/24 1422 142/97 -- -- 62 -- -- --   05/08/24 1411 139/96 -- -- 69 -- -- --   05/08/24 1407 149/99 -- -- 66 -- -- --   05/08/24 1352 140/96 -- -- 63 -- -- --   05/08/24 1337 143/92 -- -- 68 -- -- --   05/08/24 1322 134/91 -- -- 65 -- -- --   05/08/24 1307 133/95 -- -- 62 -- -- --   05/08/24 1252 126/88 -- -- 68 -- -- --   05/08/24 1238 126/84 -- -- 64 -- -- --   05/08/24 1223 122/57 -- -- 66 -- -- --   05/08/24 1213 136/92 -- -- 64 -- -- --   05/08/24 1152 125/94 -- -- 67 -- -- --   05/08/24 1140 133/96 -- -- 62 -- -- --   05/08/24 1125 135/94 -- -- 64 -- -- --   05/08/24 1120 135/94 98.1 °F (36.7 °C) Tympanic 64 18 -- --       Physical Exam  HENT:      Head: Normocephalic.   Eyes:      Conjunctiva/sclera: Conjunctivae normal.   Cardiovascular:      Rate and Rhythm: Normal rate.      Pulses: Normal pulses.   Pulmonary:      Effort: Pulmonary effort is normal.   Abdominal:      " Palpations: Abdomen is soft.      Tenderness: There is no abdominal tenderness.   Skin:     General: Skin is warm.   Neurological:      Mental Status: She is alert.   Psychiatric:         Mood and Affect: Mood normal.         I/O       None            Invasive Devices       Peripheral Intravenous Line  Duration             Peripheral IV 18 days    Peripheral IV 24 Left;Ventral (anterior) Forearm <1 day                    Results from last 7 days   Lab Units 24  0514 24  1208   WBC Thousand/uL 17.57* 13.44*   TOTAL NEUT ABS Thousands/µL  --  10.07*   HEMOGLOBIN g/dL 13.9 13.0   MCV fL 93 91   PLATELETS Thousands/uL 241 289     Results from last 7 days   Lab Units 24  0514 24  1208   POTASSIUM mmol/L 5.2 4.2   CHLORIDE mmol/L 108 105   CO2 mmol/L 17* 22   BUN mg/dL 15 14   CREATININE mg/dL 0.65 0.60   EGFR ml/min/1.73sq m 118 121     Results from last 7 days   Lab Units 24  0514 24  1208   AST U/L 30 18   ALT U/L 16 13     Results from last 7 days   Lab Units 24  0514 24  1208   PLATELETS Thousands/uL 241 289         Results from last 7 days   Lab Units 24  1208   PROT/CREAT RATIO UR  0.68*                   Brief review of pertinent history:  Past Medical History:   Diagnosis Date    Eczema     Liver cyst      Past Surgical History:   Procedure Laterality Date    FRENULECTOMY, LINGUAL      GA TONSILLECTOMY & ADENOIDECTOMY AGE 10/> N/A 10/3/2018    Procedure: TONSILLECTOMY;  Surgeon: Bradley Leon MD;  Location: [facility];  Service: ENT    WISDOM TOOTH EXTRACTION       OB History    Para Term  AB Living   1 0 0 0 0 0   SAB IAB Ectopic Multiple Live Births   0 0 0 0 0      # Outcome Date GA Lbr Chucho/2nd Weight Sex Delivery Anes PTL Lv   1 Current                Fetal data:  Nonstress test: date 24 Time: 707 - 741  Baseline:  130  Variability: moderate  Accelerations: present, 15x15  Decelerations: absent  Contractions:  absent  Assessment: reactive      MEDS:   Medication Administration - last 24 hours from 05/08/2024 0755 to 05/09/2024 0755         Date/Time Order Dose Route Action Action by     05/09/2024 0308 EDT acetaminophen (TYLENOL) tablet 650 mg 650 mg Oral Given Nael Garcia, JUNE     05/08/2024 1839 EDT acetaminophen (TYLENOL) tablet 650 mg 650 mg Oral Given Isabella Le RN     05/08/2024 1204 EDT acetaminophen (TYLENOL) tablet 650 mg 650 mg Oral Given Isabella Le RN     05/09/2024 0742 EDT metoclopramide (REGLAN) tablet 10 mg 10 mg Oral Given Jessika Waller, JUNE     05/08/2024 1608 EDT metoclopramide (REGLAN) tablet 10 mg 10 mg Oral Given Isabella Le RN     05/08/2024 1608 EDT diphenhydrAMINE (BENADRYL) tablet 25 mg 25 mg Oral Given Isabella Le RN     05/08/2024 1725 EDT famotidine (PEPCID) tablet 20 mg 20 mg Oral Given Isabella Le RN     05/08/2024 1725 EDT betamethasone acetate-betamethasone sodium phosphate (CELESTONE) injection 12 mg 12 mg Intramuscular Given Isabella Le RN          Current Facility-Administered Medications   Medication Dose Route Frequency    acetaminophen (TYLENOL) tablet 650 mg  650 mg Oral Q6H PRN    betamethasone acetate-betamethasone sodium phosphate (CELESTONE) injection 12 mg  12 mg Intramuscular Q24H    calcium carbonate (TUMS) chewable tablet 1,000 mg  1,000 mg Oral Daily PRN    diphenhydrAMINE (BENADRYL) tablet 25 mg  25 mg Oral Q6H PRN    famotidine (PEPCID) tablet 20 mg  20 mg Oral BID    metoclopramide (REGLAN) tablet 10 mg  10 mg Oral TID AC    simethicone (MYLICON) chewable tablet 80 mg  80 mg Oral 4x Daily PRN            Kiko Collins MD  OBGYN, PGY-3  5/9/2024  7:55 AM

## 2024-05-12 ENCOUNTER — HOSPITAL ENCOUNTER (INPATIENT)
Facility: HOSPITAL | Age: 29
LOS: 5 days | Discharge: HOME | DRG: 998 | End: 2024-05-18
Attending: OBSTETRICS & GYNECOLOGY | Admitting: OBSTETRICS & GYNECOLOGY
Payer: COMMERCIAL

## 2024-05-12 ENCOUNTER — NURSE TRIAGE (OUTPATIENT)
Dept: OTHER | Facility: OTHER | Age: 29
End: 2024-05-12

## 2024-05-12 DIAGNOSIS — Z3A.33 33 WEEKS GESTATION OF PREGNANCY: ICD-10-CM

## 2024-05-12 DIAGNOSIS — O14.93 PRE-ECLAMPSIA IN THIRD TRIMESTER: Primary | ICD-10-CM

## 2024-05-12 DIAGNOSIS — Z98.891 STATUS POST PRIMARY LOW TRANSVERSE CESAREAN SECTION: ICD-10-CM

## 2024-05-12 LAB
ALBUMIN SERPL BCP-MCNC: 3.6 G/DL (ref 3.5–5)
ALP SERPL-CCNC: 90 U/L (ref 34–104)
ALT SERPL W P-5'-P-CCNC: 15 U/L (ref 7–52)
ANION GAP SERPL CALCULATED.3IONS-SCNC: 11 MMOL/L (ref 4–13)
AST SERPL W P-5'-P-CCNC: 16 U/L (ref 13–39)
BILIRUB SERPL-MCNC: 0.24 MG/DL (ref 0.2–1)
BUN SERPL-MCNC: 15 MG/DL (ref 5–25)
CALCIUM SERPL-MCNC: 8.9 MG/DL (ref 8.4–10.2)
CHLORIDE SERPL-SCNC: 108 MMOL/L (ref 96–108)
CO2 SERPL-SCNC: 20 MMOL/L (ref 21–32)
CREAT SERPL-MCNC: 0.67 MG/DL (ref 0.6–1.3)
ERYTHROCYTE [DISTWIDTH] IN BLOOD BY AUTOMATED COUNT: 12.6 % (ref 11.6–15.1)
GFR SERPL CREATININE-BSD FRML MDRD: 117 ML/MIN/1.73SQ M
GLUCOSE SERPL-MCNC: 80 MG/DL (ref 65–140)
HCT VFR BLD AUTO: 36.6 % (ref 34.8–46.1)
HGB BLD-MCNC: 12.7 G/DL (ref 11.5–15.4)
MCH RBC QN AUTO: 31 PG (ref 26.8–34.3)
MCHC RBC AUTO-ENTMCNC: 34.7 G/DL (ref 31.4–37.4)
MCV RBC AUTO: 89 FL (ref 82–98)
PLATELET # BLD AUTO: 312 THOUSANDS/UL (ref 149–390)
PMV BLD AUTO: 11.1 FL (ref 8.9–12.7)
POTASSIUM SERPL-SCNC: 4 MMOL/L (ref 3.5–5.3)
PROT SERPL-MCNC: 6.1 G/DL (ref 6.4–8.4)
RBC # BLD AUTO: 4.1 MILLION/UL (ref 3.81–5.12)
SODIUM SERPL-SCNC: 139 MMOL/L (ref 135–147)
WBC # BLD AUTO: 18.31 THOUSAND/UL (ref 4.31–10.16)

## 2024-05-12 PROCEDURE — 86900 BLOOD TYPING SEROLOGIC ABO: CPT | Performed by: OBSTETRICS & GYNECOLOGY

## 2024-05-12 PROCEDURE — 82570 ASSAY OF URINE CREATININE: CPT | Performed by: OBSTETRICS & GYNECOLOGY

## 2024-05-12 PROCEDURE — 86780 TREPONEMA PALLIDUM: CPT | Performed by: OBSTETRICS & GYNECOLOGY

## 2024-05-12 PROCEDURE — 85027 COMPLETE CBC AUTOMATED: CPT | Performed by: OBSTETRICS & GYNECOLOGY

## 2024-05-12 PROCEDURE — 86850 RBC ANTIBODY SCREEN: CPT | Performed by: OBSTETRICS & GYNECOLOGY

## 2024-05-12 PROCEDURE — 80053 COMPREHEN METABOLIC PANEL: CPT | Performed by: OBSTETRICS & GYNECOLOGY

## 2024-05-12 PROCEDURE — 85007 BL SMEAR W/DIFF WBC COUNT: CPT | Performed by: OBSTETRICS & GYNECOLOGY

## 2024-05-12 PROCEDURE — 86901 BLOOD TYPING SEROLOGIC RH(D): CPT | Performed by: OBSTETRICS & GYNECOLOGY

## 2024-05-12 PROCEDURE — NC001 PR NO CHARGE: Performed by: STUDENT IN AN ORGANIZED HEALTH CARE EDUCATION/TRAINING PROGRAM

## 2024-05-12 PROCEDURE — NC001 PR NO CHARGE: Performed by: OBSTETRICS & GYNECOLOGY

## 2024-05-12 PROCEDURE — 86920 COMPATIBILITY TEST SPIN: CPT

## 2024-05-12 PROCEDURE — 84156 ASSAY OF PROTEIN URINE: CPT | Performed by: OBSTETRICS & GYNECOLOGY

## 2024-05-12 RX ORDER — METOCLOPRAMIDE 10 MG/1
10 TABLET ORAL ONCE
Status: COMPLETED | OUTPATIENT
Start: 2024-05-12 | End: 2024-05-12

## 2024-05-12 RX ORDER — DIPHENHYDRAMINE HCL 25 MG
25 TABLET ORAL ONCE
Status: COMPLETED | OUTPATIENT
Start: 2024-05-12 | End: 2024-05-12

## 2024-05-12 RX ORDER — MAGNESIUM SULFATE HEPTAHYDRATE 40 MG/ML
2 INJECTION, SOLUTION INTRAVENOUS ONCE
Status: DISCONTINUED | OUTPATIENT
Start: 2024-05-12 | End: 2024-05-12

## 2024-05-12 RX ORDER — DIPHENHYDRAMINE HYDROCHLORIDE 50 MG/ML
25 INJECTION INTRAMUSCULAR; INTRAVENOUS ONCE
Status: DISCONTINUED | OUTPATIENT
Start: 2024-05-12 | End: 2024-05-12

## 2024-05-12 RX ORDER — METOCLOPRAMIDE HYDROCHLORIDE 5 MG/ML
10 INJECTION INTRAMUSCULAR; INTRAVENOUS ONCE
Status: DISCONTINUED | OUTPATIENT
Start: 2024-05-12 | End: 2024-05-12

## 2024-05-12 RX ADMIN — DIPHENHYDRAMINE HYDROCHLORIDE 25 MG: 25 TABLET ORAL at 23:51

## 2024-05-12 RX ADMIN — METOCLOPRAMIDE 10 MG: 10 TABLET ORAL at 23:51

## 2024-05-13 ENCOUNTER — ANESTHESIA EVENT (INPATIENT)
Dept: LABOR AND DELIVERY | Facility: HOSPITAL | Age: 29
End: 2024-05-13
Payer: COMMERCIAL

## 2024-05-13 PROBLEM — O14.93 PRE-ECLAMPSIA DURING PREGNANCY IN THIRD TRIMESTER, ANTEPARTUM: Status: ACTIVE | Noted: 2024-05-08

## 2024-05-13 LAB
ABO GROUP BLD: NORMAL
ALBUMIN SERPL BCP-MCNC: 3.2 G/DL (ref 3.5–5)
ALBUMIN SERPL BCP-MCNC: 3.4 G/DL (ref 3.5–5)
ALP SERPL-CCNC: 86 U/L (ref 34–104)
ALP SERPL-CCNC: 88 U/L (ref 34–104)
ALT SERPL W P-5'-P-CCNC: 12 U/L (ref 7–52)
ALT SERPL W P-5'-P-CCNC: 13 U/L (ref 7–52)
ANION GAP SERPL CALCULATED.3IONS-SCNC: 10 MMOL/L (ref 4–13)
ANION GAP SERPL CALCULATED.3IONS-SCNC: 10 MMOL/L (ref 4–13)
ANISOCYTOSIS BLD QL SMEAR: PRESENT
AST SERPL W P-5'-P-CCNC: 13 U/L (ref 13–39)
AST SERPL W P-5'-P-CCNC: 27 U/L (ref 13–39)
BASOPHILS # BLD MANUAL: 0 THOUSAND/UL (ref 0–0.1)
BASOPHILS NFR MAR MANUAL: 0 % (ref 0–1)
BILIRUB SERPL-MCNC: 0.26 MG/DL (ref 0.2–1)
BILIRUB SERPL-MCNC: 0.33 MG/DL (ref 0.2–1)
BLD GP AB SCN SERPL QL: NEGATIVE
BUN SERPL-MCNC: 13 MG/DL (ref 5–25)
BUN SERPL-MCNC: 14 MG/DL (ref 5–25)
CALCIUM ALBUM COR SERPL-MCNC: 8.7 MG/DL (ref 8.3–10.1)
CALCIUM ALBUM COR SERPL-MCNC: 9.2 MG/DL (ref 8.3–10.1)
CALCIUM SERPL-MCNC: 8.1 MG/DL (ref 8.4–10.2)
CALCIUM SERPL-MCNC: 8.7 MG/DL (ref 8.4–10.2)
CHLORIDE SERPL-SCNC: 108 MMOL/L (ref 96–108)
CHLORIDE SERPL-SCNC: 111 MMOL/L (ref 96–108)
CO2 SERPL-SCNC: 18 MMOL/L (ref 21–32)
CO2 SERPL-SCNC: 18 MMOL/L (ref 21–32)
CREAT SERPL-MCNC: 0.63 MG/DL (ref 0.6–1.3)
CREAT SERPL-MCNC: 0.69 MG/DL (ref 0.6–1.3)
CREAT UR-MCNC: 135.6 MG/DL
EOSINOPHIL # BLD MANUAL: 0.55 THOUSAND/UL (ref 0–0.4)
EOSINOPHIL NFR BLD MANUAL: 3 % (ref 0–6)
ERYTHROCYTE [DISTWIDTH] IN BLOOD BY AUTOMATED COUNT: 12.8 % (ref 11.6–15.1)
ERYTHROCYTE [DISTWIDTH] IN BLOOD BY AUTOMATED COUNT: 12.9 % (ref 11.6–15.1)
GFR SERPL CREATININE-BSD FRML MDRD: 116 ML/MIN/1.73SQ M
GFR SERPL CREATININE-BSD FRML MDRD: 119 ML/MIN/1.73SQ M
GIANT PLATELETS BLD QL SMEAR: PRESENT
GLUCOSE SERPL-MCNC: 90 MG/DL (ref 65–140)
GLUCOSE SERPL-MCNC: 95 MG/DL (ref 65–140)
HCT VFR BLD AUTO: 37.1 % (ref 34.8–46.1)
HCT VFR BLD AUTO: 39.5 % (ref 34.8–46.1)
HGB BLD-MCNC: 12.4 G/DL (ref 11.5–15.4)
HGB BLD-MCNC: 12.7 G/DL (ref 11.5–15.4)
LG PLATELETS BLD QL SMEAR: PRESENT
LYMPHOCYTES # BLD AUTO: 16 % (ref 14–44)
LYMPHOCYTES # BLD AUTO: 2.93 THOUSAND/UL (ref 0.6–4.47)
MAGNESIUM SERPL-MCNC: 4.9 MG/DL (ref 1.9–2.7)
MCH RBC QN AUTO: 30.8 PG (ref 26.8–34.3)
MCH RBC QN AUTO: 31 PG (ref 26.8–34.3)
MCHC RBC AUTO-ENTMCNC: 31.4 G/DL (ref 31.4–37.4)
MCHC RBC AUTO-ENTMCNC: 34.2 G/DL (ref 31.4–37.4)
MCV RBC AUTO: 91 FL (ref 82–98)
MCV RBC AUTO: 98 FL (ref 82–98)
METAMYELOCYTE ABSOLUTE CT: 0.73 THOUSAND/UL (ref 0–0.1)
METAMYELOCYTES NFR BLD MANUAL: 4 % (ref 0–1)
MONOCYTES # BLD AUTO: 1.46 THOUSAND/UL (ref 0–1.22)
MONOCYTES NFR BLD: 8 % (ref 4–12)
MYELOCYTE ABSOLUTE CT: 0.37 THOUSAND/UL (ref 0–0.1)
MYELOCYTES NFR BLD MANUAL: 2 % (ref 0–1)
NEUTROPHILS # BLD MANUAL: 12.27 THOUSAND/UL (ref 1.85–7.62)
NEUTS SEG NFR BLD AUTO: 67 % (ref 43–75)
PLATELET # BLD AUTO: 297 THOUSANDS/UL (ref 149–390)
PLATELET # BLD AUTO: 314 THOUSANDS/UL (ref 149–390)
PLATELET BLD QL SMEAR: ADEQUATE
PMV BLD AUTO: 11 FL (ref 8.9–12.7)
PMV BLD AUTO: 11.1 FL (ref 8.9–12.7)
POLYCHROMASIA BLD QL SMEAR: PRESENT
POTASSIUM SERPL-SCNC: 3.7 MMOL/L (ref 3.5–5.3)
POTASSIUM SERPL-SCNC: 4.9 MMOL/L (ref 3.5–5.3)
PROT SERPL-MCNC: 5.8 G/DL (ref 6.4–8.4)
PROT SERPL-MCNC: 6.2 G/DL (ref 6.4–8.4)
PROT UR-MCNC: 453 MG/DL
PROT/CREAT UR: 3.34 MG/G{CREAT} (ref 0–0.1)
RBC # BLD AUTO: 4.03 MILLION/UL (ref 3.81–5.12)
RBC # BLD AUTO: 4.1 MILLION/UL (ref 3.81–5.12)
RH BLD: POSITIVE
SODIUM SERPL-SCNC: 136 MMOL/L (ref 135–147)
SODIUM SERPL-SCNC: 139 MMOL/L (ref 135–147)
SPECIMEN EXPIRATION DATE: NORMAL
TREPONEMA PALLIDUM IGG+IGM AB [PRESENCE] IN SERUM OR PLASMA BY IMMUNOASSAY: NORMAL
WBC # BLD AUTO: 18.51 THOUSAND/UL (ref 4.31–10.16)
WBC # BLD AUTO: 20.32 THOUSAND/UL (ref 4.31–10.16)

## 2024-05-13 PROCEDURE — 99232 SBSQ HOSP IP/OBS MODERATE 35: CPT | Performed by: STUDENT IN AN ORGANIZED HEALTH CARE EDUCATION/TRAINING PROGRAM

## 2024-05-13 PROCEDURE — 87086 URINE CULTURE/COLONY COUNT: CPT | Performed by: OBSTETRICS & GYNECOLOGY

## 2024-05-13 PROCEDURE — 83735 ASSAY OF MAGNESIUM: CPT

## 2024-05-13 PROCEDURE — 59025 FETAL NON-STRESS TEST: CPT | Performed by: STUDENT IN AN ORGANIZED HEALTH CARE EDUCATION/TRAINING PROGRAM

## 2024-05-13 PROCEDURE — NC001 PR NO CHARGE: Performed by: OBSTETRICS & GYNECOLOGY

## 2024-05-13 PROCEDURE — 80053 COMPREHEN METABOLIC PANEL: CPT

## 2024-05-13 PROCEDURE — 99212 OFFICE O/P EST SF 10 MIN: CPT

## 2024-05-13 PROCEDURE — 85027 COMPLETE CBC AUTOMATED: CPT

## 2024-05-13 RX ORDER — SODIUM CHLORIDE, SODIUM LACTATE, POTASSIUM CHLORIDE, CALCIUM CHLORIDE 600; 310; 30; 20 MG/100ML; MG/100ML; MG/100ML; MG/100ML
50 INJECTION, SOLUTION INTRAVENOUS CONTINUOUS
Status: DISCONTINUED | OUTPATIENT
Start: 2024-05-13 | End: 2024-05-13

## 2024-05-13 RX ORDER — ONDANSETRON 2 MG/ML
4 INJECTION INTRAMUSCULAR; INTRAVENOUS EVERY 8 HOURS PRN
Status: DISCONTINUED | OUTPATIENT
Start: 2024-05-13 | End: 2024-05-14

## 2024-05-13 RX ORDER — MAGNESIUM SULFATE HEPTAHYDRATE 40 MG/ML
1 INJECTION, SOLUTION INTRAVENOUS CONTINUOUS
Status: DISCONTINUED | OUTPATIENT
Start: 2024-05-13 | End: 2024-05-17

## 2024-05-13 RX ORDER — SODIUM CHLORIDE, SODIUM LACTATE, POTASSIUM CHLORIDE, CALCIUM CHLORIDE 600; 310; 30; 20 MG/100ML; MG/100ML; MG/100ML; MG/100ML
50 INJECTION, SOLUTION INTRAVENOUS CONTINUOUS
Status: DISCONTINUED | OUTPATIENT
Start: 2024-05-13 | End: 2024-05-18 | Stop reason: HOSPADM

## 2024-05-13 RX ORDER — ACETAMINOPHEN 325 MG/1
650 TABLET ORAL EVERY 4 HOURS PRN
Status: DISCONTINUED | OUTPATIENT
Start: 2024-05-13 | End: 2024-05-14

## 2024-05-13 RX ORDER — FAMOTIDINE 20 MG/1
20 TABLET, FILM COATED ORAL 2 TIMES DAILY
Status: DISCONTINUED | OUTPATIENT
Start: 2024-05-13 | End: 2024-05-18 | Stop reason: HOSPADM

## 2024-05-13 RX ORDER — CALCIUM GLUCONATE 94 MG/ML
1 INJECTION, SOLUTION INTRAVENOUS ONCE AS NEEDED
Status: DISCONTINUED | OUTPATIENT
Start: 2024-05-13 | End: 2024-05-18 | Stop reason: HOSPADM

## 2024-05-13 RX ORDER — MAGNESIUM SULFATE HEPTAHYDRATE 40 MG/ML
4 INJECTION, SOLUTION INTRAVENOUS ONCE
Qty: 100 ML | Refills: 0 | Status: COMPLETED | OUTPATIENT
Start: 2024-05-13 | End: 2024-05-13

## 2024-05-13 RX ORDER — METOCLOPRAMIDE 10 MG/1
10 TABLET ORAL 3 TIMES DAILY PRN
Status: DISCONTINUED | OUTPATIENT
Start: 2024-05-13 | End: 2024-05-18 | Stop reason: HOSPADM

## 2024-05-13 RX ORDER — SENNOSIDES 8.6 MG
1 TABLET ORAL
Status: DISCONTINUED | OUTPATIENT
Start: 2024-05-13 | End: 2024-05-14

## 2024-05-13 RX ORDER — CEFAZOLIN SODIUM 2 G/50ML
2000 SOLUTION INTRAVENOUS ONCE
Qty: 50 ML | Refills: 0 | Status: COMPLETED | OUTPATIENT
Start: 2024-05-14 | End: 2024-05-14

## 2024-05-13 RX ORDER — LABETALOL HYDROCHLORIDE 5 MG/ML
20 INJECTION, SOLUTION INTRAVENOUS ONCE
Qty: 4 ML | Refills: 0 | Status: COMPLETED | OUTPATIENT
Start: 2024-05-13 | End: 2024-05-13

## 2024-05-13 RX ORDER — MAGNESIUM SULFATE HEPTAHYDRATE 40 MG/ML
2 INJECTION, SOLUTION INTRAVENOUS ONCE
Qty: 50 ML | Refills: 0 | Status: COMPLETED | OUTPATIENT
Start: 2024-05-13 | End: 2024-05-13

## 2024-05-13 RX ADMIN — LABETALOL HYDROCHLORIDE 20 MG: 5 INJECTION, SOLUTION INTRAVENOUS at 15:36

## 2024-05-13 RX ADMIN — MAGNESIUM SULFATE HEPTAHYDRATE 4 G: 40 INJECTION, SOLUTION INTRAVENOUS at 15:41

## 2024-05-13 RX ADMIN — MAGNESIUM SULFATE HEPTAHYDRATE 2 G/HR: 40 INJECTION, SOLUTION INTRAVENOUS at 16:20

## 2024-05-13 RX ADMIN — SODIUM CHLORIDE, SODIUM LACTATE, POTASSIUM CHLORIDE, AND CALCIUM CHLORIDE 50 ML/HR: .6; .31; .03; .02 INJECTION, SOLUTION INTRAVENOUS at 19:00

## 2024-05-13 RX ADMIN — MAGNESIUM SULFATE HEPTAHYDRATE 2 G: 40 INJECTION, SOLUTION INTRAVENOUS at 16:08

## 2024-05-13 RX ADMIN — ACETAMINOPHEN 325MG 650 MG: 325 TABLET ORAL at 07:21

## 2024-05-13 RX ADMIN — FAMOTIDINE 20 MG: 20 TABLET, FILM COATED ORAL at 21:03

## 2024-05-13 RX ADMIN — FAMOTIDINE 20 MG: 20 TABLET, FILM COATED ORAL at 08:20

## 2024-05-13 RX ADMIN — ACETAMINOPHEN 325MG 650 MG: 325 TABLET ORAL at 23:58

## 2024-05-13 NOTE — ASSESSMENT & PLAN NOTE
Pt meeting criteria for Pre-E w/o SF  Headache improved  Systolic (24hrs), Av , Min:128 , Max:153   Diastolic (24hrs), Av, Min:71, Max:91  UPC from 0.68 to 3.34   Recommend delivery for evolving severe features, especially in the setting of FGR fetus  Will obtain urine culture off cath to assess for UTI

## 2024-05-13 NOTE — PROGRESS NOTES
Progress Note - Maternal Fetal Medicine   Eva Garduno 29 y.o. female MRN: [id number]  Unit/Bed#: -01 Encounter: [id number]  Admit date: 2024.  Today's date: 24    Assessment/Plan     Ms. Garduno is a 29 y.o.  at 33w6d, Hospital Day: 2, admitted with Pre-E w/o SF and FGR.  By issue:    Pre-eclampsia during pregnancy in third trimester, antepartum  Assessment & Plan  Pt meeting criteria for Pre-E w/o SF  Headache improved  Systolic (24hrs), Av , Min:128 , Max:153   Diastolic (24hrs), Av, Min:71, Max:91  UPC from 0.68 to 3.34   Recommend delivery for evolving severe features, especially in the setting of FGR fetus  Will obtain urine culture off cath to assess for UTI    Fetal growth restriction antepartum  Assessment & Plan    MEASUREMENTS (* Included In Average GA)     AC             26.51 cm        30 weeks 4 days* (13%)  BPD             8.03 cm        32 weeks 1 day * (49%)  HC             28.53 cm        31 weeks 2 days* (6%)  Femur           5.74 cm        30 weeks 1 day * (4%)     HC/AC           1.08 [0.96 - 1.17]                 (60%)  FL/AC             22 [20 - 24]  FL/BPD            71 [71 - 87]  EFW Hadlock 4   1612 grams - 3 lbs 9 oz                 (9%)    Normal dopplers    Positive GBS test  Assessment & Plan  Needs PCN in labor    * 33 weeks gestation of pregnancy  Assessment & Plan  Admit to OB/GYN service  Admission labs wnl  EFW: 9%, 3.5 lbs  VTX by manual exam  GBS pos status: will need PCN for prophylaxis when in labor  She is aware that growth restricted babies will sometimes not tolerate labor and may need a  delivery  Steroid complete  NICU consult                 Subjective    Contractions: no  Loss of fluid: no  Vaginal bleeding: no  Fetal movement: yes    Pain: no  Headaches: no  Visual changes: no  Chest pain: no  Shortness of breath: no  Nausea: no  Vomiting/Diarrhea: no  Dysuria: no  Leg pain: no          Objective      Patient Vitals for the past  "24 hrs:   BP Temp Temp src Pulse Resp Height Weight   05/13/24 1231 129/71 98.1 °F (36.7 °C) Oral 69 17 -- --   05/13/24 0821 139/84 98 °F (36.7 °C) Oral 66 16 -- --   05/13/24 0620 128/84 -- -- 74 -- -- --   05/13/24 0334 128/79 -- -- 72 -- -- --   05/12/24 2248 153/91 (!) 97.4 °F (36.3 °C) Temporal 65 20 5' 4\" (1.626 m) 83 kg (183 lb)       Physical Exam  HENT:      Head: Normocephalic.      Mouth/Throat:      Pharynx: Oropharynx is clear.   Eyes:      Conjunctiva/sclera: Conjunctivae normal.   Cardiovascular:      Rate and Rhythm: Normal rate.      Pulses: Normal pulses.   Pulmonary:      Effort: Pulmonary effort is normal.   Abdominal:      Palpations: Abdomen is soft.      Tenderness: There is no abdominal tenderness.   Skin:     General: Skin is warm.   Neurological:      Mental Status: She is alert and oriented to person, place, and time.   Psychiatric:         Mood and Affect: Mood normal.         I/O       None            Invasive Devices       Peripheral Intravenous Line  Duration             Peripheral IV 12/01/18 1990 days    Peripheral IV 05/13/24 Right;Ventral (anterior) Forearm <1 day                    Results from last 7 days   Lab Units 05/12/24 2317 05/09/24 0514 05/08/24  1208   WBC Thousand/uL 18.31* 17.57* 13.44*   TOTAL NEUT ABS Thousands/µL  --   --  10.07*   HEMOGLOBIN g/dL 12.7 13.9 13.0   MCV fL 89 93 91   PLATELETS Thousands/uL 312 241 289     Results from last 7 days   Lab Units 05/12/24 2317 05/09/24 0514 05/08/24  1208   POTASSIUM mmol/L 4.0 5.2 4.2   CHLORIDE mmol/L 108 108 105   CO2 mmol/L 20* 17* 22   BUN mg/dL 15 15 14   CREATININE mg/dL 0.67 0.65 0.60   EGFR ml/min/1.73sq m 117 118 121     Results from last 7 days   Lab Units 05/12/24 2317 05/09/24 0514 05/08/24  1208   AST U/L 16 30 18   ALT U/L 15 16 13     Results from last 7 days   Lab Units 05/12/24  2317 05/09/24  0514 05/08/24  1208   PLATELETS Thousands/uL 312 241 289         Results from last 7 days   Lab Units " 24  2255 24  1208   PROT/CREAT RATIO UR  3.34* 0.68*                   Brief review of pertinent history:  Past Medical History:   Diagnosis Date    Eczema     Liver cyst      Past Surgical History:   Procedure Laterality Date    FRENULECTOMY, LINGUAL      NM TONSILLECTOMY & ADENOIDECTOMY AGE 10/> N/A 10/3/2018    Procedure: TONSILLECTOMY;  Surgeon: Bradley Leon MD;  Location: [facility];  Service: ENT    WISDOM TOOTH EXTRACTION       OB History    Para Term  AB Living   1 0 0 0 0 0   SAB IAB Ectopic Multiple Live Births   0 0 0 0 0      # Outcome Date GA Lbr Chucho/2nd Weight Sex Delivery Anes PTL Lv   1 Current                Fetal data:  135 bpm with moderate variability, accelerations, no decelerations, no contractions    MEDS:   Medication Administration - last 24 hours from 2024 1233 to 2024 1233         Date/Time Order Dose Route Action Action by     2024 2326 EDT magnesium sulfate 2 g/50 mL IVPB (premix) 2 g 2 g Intravenous Not Given Aditi Tellez RN     2024 2326 EDT diphenhydrAMINE (BENADRYL) injection 25 mg 25 mg Intravenous Not Given Aditi Tellez RN     2024 2327 EDT metoclopramide (REGLAN) injection 10 mg 10 mg Intravenous Not Given Aditi Tellez RN     2024 2351 EDT diphenhydrAMINE (BENADRYL) tablet 25 mg 25 mg Oral Given Aditi Tellez RN     2024 2351 EDT metoclopramide (REGLAN) tablet 10 mg 10 mg Oral Given Aditi Tellez RN     2024 0820 EDT famotidine (PEPCID) tablet 20 mg 20 mg Oral Given Jessika Waller RN     2024 0721 EDT acetaminophen (TYLENOL) tablet 650 mg 650 mg Oral Given Jessika Waller RN     2024 0151 EDT senna (SENOKOT) tablet 8.6 mg 0 mg Oral Hold Aditi Tellez RN          Current Facility-Administered Medications   Medication Dose Route Frequency    acetaminophen (TYLENOL) tablet 650 mg  650 mg Oral Q4H PRN    famotidine (PEPCID) tablet 20 mg  20 mg Oral BID    ondansetron (ZOFRAN)  injection 4 mg  4 mg Intravenous Q8H PRN    senna (SENOKOT) tablet 8.6 mg  1 tablet Oral HS            MD SAMIR Hicks, PGY-3  5/13/2024  12:33 PM

## 2024-05-13 NOTE — PROGRESS NOTES
Discussed diagnosis of pre-eclampsia with severe features with patient. Discussed starting magnesium-sulfate infusion for seizure prophylaxis.     Also discussed delivery mode, as patient interested in  section.   We discussed the risks of  including bleeding, infection, and injury to surrounding structures including the bowel and bladder.     She was counseled that there are medical and surgical methods to manage excessive postpartum bleeding. She was counseled that in the event of excessive blood loss, she may require blood transfusion which includes a small risk of blood borne diseases such as hepatitis and HIV. The patient is OK with receiving a blood transfusion if necessary.  The patient had an opportunity to ask questions and signed consent.     Jamaica Stout MD  Obstetrics & Gynecology, PGY-2

## 2024-05-13 NOTE — TELEPHONE ENCOUNTER
"Answer Assessment - Initial Assessment Questions  1. BLOOD PRESSURE: \"What is the blood pressure?\" \"Did you take at least two measurements 5 minutes apart?\"      158/99      4. HISTORY: \"Do you have a history of high blood pressure?\"      Recent admission for pre-eclampsia      6. OTHER SYMPTOMS: \"Do you have any symptoms?\" (e.g., headache, chest pain, blurred vision, difficulty breathing, weakness)      Mild headache      Mild SOB    7. PREGNANCY: \"Is there any chance you are pregnant?\" \"When was your last menstrual period?\"      G1 33w5d w recent admission for pre e    No contractions  Baby moving well  No LOF or vaginal bleeding  Mild headache no blurry vision or double vision  Mild SOB    Already en route to [place]    Protocols used: High Blood Pressure-ADULT-AH    PT already en route to [facility]. Notified on call and L&D charge nurse. Read receipt noted from on call Dr Hurtado.   "

## 2024-05-13 NOTE — CONSULTS
Consult: MFSAULO Garduno 29 y.o. female MRN: [id number]  Unit/Bed#: LD TRIAGE  Encounter: [id number]      Assessment/Plan:    29 y.o.  at 33w6d admitted for preeclampsia without severe features, w/ FGR 9% and increase in UPC from 0.68 to 3.34 & associated dyspnea    Gestational proteinuria without hypertension during pregnancy in third trimester  Assessment & Plan  Evolving preeclampsia - has only had one elevated BP (153/91) here  Reports headache and epigastric pain with dyspnea  UPC from 0.68 to 3.34   Will obtain urine culture off cath to assess for UTI    Fetal growth restriction antepartum  Assessment & Plan    MEASUREMENTS (* Included In Average GA)     AC             26.51 cm        30 weeks 4 days* (13%)  BPD             8.03 cm        32 weeks 1 day * (49%)  HC             28.53 cm        31 weeks 2 days* (6%)  Femur           5.74 cm        30 weeks 1 day * (4%)     HC/AC           1.08 [0.96 - 1.17]                 (60%)  FL/AC             22 [20 - 24]  FL/BPD            71 [71 - 87]  EFW Hadlock 4   1612 grams - 3 lbs 9 oz                 (9%)    Normal dopplers    Positive GBS test  Assessment & Plan  Needs PCN in labor    * 33 weeks gestation of pregnancy  Assessment & Plan  Admit to OB/GYN service  Follow up CBC, RPR, Blood Type  EFW: 9%, 3.5 lbs  VTX by manual exam  GBS pos status: will need PCN for prophylaxis when in labor  She is aware that growth restricted babies will sometimes not tolerate labor and may need a  delivery  Steroid complete  NICU consult                   Patient of: [facility]  This patient will be an INPATIENT  and I certify the anticipated length of stay is >2 Midnights  Discussed with Dr. Hurtado & Dr. Saavedra      SUBJECTIVE:    Reason for Consult / Principal Problem: preeclampsia with associated FGR  Subspeciality: Perinatology      HPI: Eva Garduno is a 29 y.o.  with an CHERRIE of 2024, by Last Menstrual Period at 33w6d who is being  admitted for evolving preeclampsia without severe features, w/ FGR 9% and increase in UPC from 0.68 to 3.34 & associated dyspnea. She denies having uterine contractions, has no LOF, and reports no VB. She states she has felt good FM.    She presents with complaint of shortness of breath.  She reports it feels like she is running a marathon.  She reports that she has headache was a 10 out of 10 earlier and she took some medication at home which brought her back to an 8 out of 10, but now is more mild in nature. She denies visual changes.  She also reports epigastric pain and swelling of her feet after she took her socks off earlier.       Patient Active Problem List   Diagnosis    Focal nodular hyperplasia of liver    Allergic rhinitis    Hemorrhoids    Positive GBS test    33 weeks gestation of pregnancy    Decreased fetal movements in second trimester    Fetal growth restriction antepartum    Proteinuria    Gestational proteinuria without hypertension during pregnancy in third trimester    Headache in pregnancy, antepartum, third trimester    Preeclampsia         OB History    Para Term  AB Living   1 0 0 0 0 0   SAB IAB Ectopic Multiple Live Births   0 0 0 0 0      # Outcome Date GA Lbr Chucho/2nd Weight Sex Delivery Anes PTL Lv   1 Current                Past Medical History:   Diagnosis Date    Eczema     Liver cyst        Past Surgical History:   Procedure Laterality Date    FRENULECTOMY, LINGUAL      MI TONSILLECTOMY & ADENOIDECTOMY AGE 10/> N/A 10/3/2018    Procedure: TONSILLECTOMY;  Surgeon: Bradley Leon MD;  Location: [facility];  Service: ENT    WISDOM TOOTH EXTRACTION         Social History     Tobacco Use    Smoking status: Never    Smokeless tobacco: Never   Substance Use Topics    Alcohol use: Not Currently     Comment: socially       No Known Allergies    Medications Prior to Admission   Medication    acetaminophen (TYLENOL) 325 mg tablet    calcium carbonate (TUMS) 500 mg chewable tablet     "cetirizine (ZyrTEC) 5 MG tablet    famotidine (PEPCID) 20 mg tablet    metoclopramide (REGLAN) 10 mg tablet    Prenatal Vit-Fe Fumarate-FA (PRENATAL VITAMIN PO)    diphenhydrAMINE HCl (BENADRYL ALLERGY PO)    EPINEPHrine (EPIPEN) 0.3 mg/0.3 mL SOAJ           OBJECTIVE:  Vitals:  Temp:  [97.4 °F (36.3 °C)] 97.4 °F (36.3 °C)  HR:  [65] 65  Resp:  [20] 20  BP: (153)/(91) 153/91  Body mass index is 31.41 kg/m².     Physical Exam:  Constitutional: AAOx3  CV: +S1, +S2, no murmurs appreciated  Resp: No respiratory distress  Abdominal: Gravid uterus  Psychiatric: Behavioral normal  SVE: Cervical Dilation: 1  Cervical Effacement: 50  Cervical Consistency: Soft  Fetal Station: -2  Presentation: Vertex  Method: Manual  OB Examiner: Chon    FHT:  Baseline Rate (FHR): 140 bpm  Variability: Moderate  Accelerations: 15 x 15 or greater  Decelerations: None    TOCO:   Contraction Frequency (minutes): 0  Contraction Duration (seconds): 0        Pao Givens DO  OB/GYN PGY4  5/13/2024  12:58 AM        Portions of the record may have been created with voice recognition software.  Occasional wrong word or \"sound a like\" substitutions may have occurred due to the inherent limitations of voice recognition software.  Read the chart carefully and recognize, using context, where substitutions have occurred    "

## 2024-05-13 NOTE — H&P
H & P- Obstetrics   Eva Garduno 29 y.o. female MRN: [id number]  Unit/Bed#: LD TRIAGE  Encounter: [id number]      Assessment/Plan:    Eva is a 29 y.o.  at 33w6d admitted for observation for evolving preeclampsia with associated fetal growth restriction    SVE: Cervical Dilation: 1  Cervical Effacement: 50  Cervical Consistency: Soft  Fetal Station: -2  Presentation: Vertex  Method: Manual  OB Examiner: Chon    Gestational proteinuria without hypertension during pregnancy in third trimester  Assessment & Plan  Evolving preeclampsia - has only had one elevated BP (153/91) here  Reports headache and epigastric pain with dyspnea  UPC from 0.68 to 3.34   Will obtain urine culture off cath to assess for UTI    Fetal growth restriction antepartum  Assessment & Plan    MEASUREMENTS (* Included In Average GA)     AC             26.51 cm        30 weeks 4 days* (13%)  BPD             8.03 cm        32 weeks 1 day * (49%)  HC             28.53 cm        31 weeks 2 days* (6%)  Femur           5.74 cm        30 weeks 1 day * (4%)     HC/AC           1.08 [0.96 - 1.17]                 (60%)  FL/AC             22 [20 - 24]  FL/BPD            71 [71 - 87]  EFW Hadlock 4   1612 grams - 3 lbs 9 oz                 (9%)    Normal dopplers    Positive GBS test  Assessment & Plan  Needs PCN in labor    * 33 weeks gestation of pregnancy  Assessment & Plan  Admit to OB/GYN service  Follow up CBC, RPR, Blood Type  EFW: 9%, 3.5 lbs  VTX by manual exam  GBS pos status: will need PCN for prophylaxis when in labor  She is aware that growth restricted babies will sometimes not tolerate labor and may need a  delivery  Steroid complete  NICU consult             Delivery plan: With the concurrent FGR, she could be delivered between 34-37w.  Will need maternal-fetal medicine input to consider magnesium and delivery timing.    Patient of: [facility]  This patient will be an INPATIENT  and I certify the anticipated  length of stay is >2 Midnights  Discussed with Dr. Hurtado & Dr. Saavedra        SUBJECTIVE:     Reason for Consult / Principal Problem: preeclampsia with associated FGR  Subspeciality: Perinatology        HPI: Eva Garduno is a 29 y.o.  with an CHERRIE of 2024, by Last Menstrual Period at 33w6d who is being admitted for evolving preeclampsia without severe features, w/ FGR 9% and increase in UPC from 0.68 to 3.34 & associated dyspnea. She denies having uterine contractions, has no LOF, and reports no VB. She states she has felt good FM.     She presents with complaint of shortness of breath.  She reports it feels like she is running a marathon.  She reports that she has headache was a 10 out of 10 earlier and she took some medication at home which brought her back to an 8 out of 10, but now is more mild in nature. She denies visual changes.  She also reports epigastric pain and swelling of her feet after she took her socks off earlier.          Pregnancy Plan:  Pregnancy: Acosta  Fetal sex: Female  Support person: Floyd     Delivery Plans  Planned delivery method: Vaginal  Planned delivery location: [facility]  Acceptable blood products: All     Post-Delivery Plans  Feeding intentions: Breast Milk      Patient Active Problem List   Diagnosis    Focal nodular hyperplasia of liver    Allergic rhinitis    Hemorrhoids    Positive GBS test    33 weeks gestation of pregnancy    Decreased fetal movements in second trimester    Fetal growth restriction antepartum    Proteinuria    Gestational proteinuria without hypertension during pregnancy in third trimester    Headache in pregnancy, antepartum, third trimester    Preeclampsia       OB History    Para Term  AB Living   1 0 0 0 0 0   SAB IAB Ectopic Multiple Live Births   0 0 0 0 0      # Outcome Date GA Lbr Chucho/2nd Weight Sex Delivery Anes PTL Lv   1 Current                Past Medical History:   Diagnosis Date    Eczema     Liver cyst        Past  Surgical History:   Procedure Laterality Date    FRENULECTOMY, LINGUAL      CO TONSILLECTOMY & ADENOIDECTOMY AGE 10/> N/A 10/3/2018    Procedure: TONSILLECTOMY;  Surgeon: Bradley Leon MD;  Location: [facility];  Service: ENT    WISDOM TOOTH EXTRACTION         Social History     Tobacco Use    Smoking status: Never    Smokeless tobacco: Never   Substance Use Topics    Alcohol use: Not Currently     Comment: socially       No Known Allergies    Medications Prior to Admission   Medication    acetaminophen (TYLENOL) 325 mg tablet    calcium carbonate (TUMS) 500 mg chewable tablet    cetirizine (ZyrTEC) 5 MG tablet    famotidine (PEPCID) 20 mg tablet    metoclopramide (REGLAN) 10 mg tablet    Prenatal Vit-Fe Fumarate-FA (PRENATAL VITAMIN PO)    diphenhydrAMINE HCl (BENADRYL ALLERGY PO)    EPINEPHrine (EPIPEN) 0.3 mg/0.3 mL SOAJ           OBJECTIVE:  Vitals:  Temp:  [97.4 °F (36.3 °C)] 97.4 °F (36.3 °C)  HR:  [65] 65  Resp:  [20] 20  BP: (153)/(91) 153/91  Body mass index is 31.41 kg/m².     Physical Exam:  General: Well appearing, no distress  Respiratory: Unlabored breathing  Cardiovascular: Regular rate.  Abdomen: Soft, gravid, nontender  Fundal Height: Appropriate for gestational age.  Extremities: Warm and well perfused.  Non tender.  Psychiatric: Behavioral normal        FHT:  Baseline Rate (FHR): 140 bpm  Variability: Moderate  Accelerations: 15 x 15 or greater  Decelerations: None    TOCO:   Contraction Frequency (minutes): 0  Contraction Duration (seconds): 0      Prenatal Labs: Blood Type:   Lab Results   Component Value Date/Time    ABO Grouping O 05/12/2024 11:17 PM     , D (Rh type):   Lab Results   Component Value Date/Time    Rh Factor Positive 05/12/2024 11:17 PM     , HCT/HGB:   Lab Results   Component Value Date/Time    Hematocrit 36.6 05/12/2024 11:17 PM    Hemoglobin 12.7 05/12/2024 11:17 PM      , Platelets:   Lab Results   Component Value Date/Time    Platelets 312 05/12/2024 11:17 PM      , 1 hour  "Glucola:   Lab Results   Component Value Date/Time    Glucose 90 03/16/2024 08:36 AM   , Varicella: No results found for: \"VARICELLAIGG\"    , Rubella:   Lab Results   Component Value Date/Time    Rubella IgG Quant 45.8 12/12/2023 09:47 AM        , VDRL/RPR: No results found for: \"RPR\"   , Urine Culture/Screen:   Lab Results   Component Value Date/Time    Urine Culture No Growth <1000 cfu/mL 12/26/2023 08:05 AM       , Hep B:   Lab Results   Component Value Date/Time    Hepatitis B Surface Ag Non-reactive 12/12/2023 09:47 AM     , HIV: No results found for: \"HIVAGAB\"  , Group B Strep:  No results found for: \"STREPGRPB\"         Pao Givens,   5/13/2024  1:02 AM        Portions of the record may have been created with voice recognition software.  Occasional wrong word or \"sound a like\" substitutions may have occurred due to the inherent limitations of voice recognition software.  Read the chart carefully and recognize, using context, where substitutions have occurred    "

## 2024-05-13 NOTE — QUICK NOTE
Severe range blood pressure and treatment. Diagnosis: preeclampsia with severe features.    Vitals:    05/13/24 0821 05/13/24 1231 05/13/24 1506 05/13/24 1522   BP: 139/84 129/71 (!) 187/87 (!) 182/93   BP Location: Left arm Left arm     Pulse: 66 69 67 60   Resp: 16 17     Temp: 98 °F (36.7 °C) 98.1 °F (36.7 °C) 97.7 °F (36.5 °C)    TempSrc: Oral Oral Oral    Weight:       Height:          Labetalol 20 mg IV given at 1537    Post labetalol 142/97 at 15:51 and 134/81 at 16:02.     Will continue to monitor blood pressures every 10 minutes.

## 2024-05-13 NOTE — TELEPHONE ENCOUNTER
Reason for Disposition  • [1] Pregnant > 20 weeks AND [2] BP Systolic BP  >= 140 OR Diastolic >= 90    Protocols used: High Blood Pressure-ADULT-AH

## 2024-05-13 NOTE — PROGRESS NOTES
Patient now meets criteria for preeclampsia with severe features, blood pressures have responded well to 20 mg IV labetalol, she is on magnesium sulfate for seizure prophylaxis. Patient requests a  section rather than an induction of labor tonight. Reviewed risks of  section, including injury to surrounding structures, infection, bleeding, and abnormal placentation in future pregnancies. Patient verbalizes understanding of these risks and wishes to proceed with a primary  delivery. Discussed recommendation for delivery at 34w0d unless clinically indicated sooner.  section scheduled tomorrow for 0800 with Dr. Amin.    Angelina Altaimrano MD  2024  4:37 PM

## 2024-05-13 NOTE — PROGRESS NOTES
"Progress Note - OB/GYN   Eva Garduno 29 y.o. female MRN: [id number]  Unit/Bed#: -01 Encounter: [id number]    Assessment:  29 y.o.  at 33w6d with preeclampsia without severe features, as well as FGR; plan for induction of labor at 34 weeks    Plan:  1. Discussed recommendation from Dr. Valencia to proceed with induction of labor at 34 weeks due to preeclampsia without severe features and fetal growth restriction  2. Discussed induction methods, induction consent signed  3. Will confirm vertex presentation and re-check cervix prior to start of induction at midnight tonight  4. F/u repeat CBC, CMP  5. FHR reactive, category 1; NST TID until start of induction  6. Will need penicillin for GBS prophylaxis during labor  7. Does not yet meet criteria for preeclampsia with severe features, but will continue to closely monitor, and start magnesium sulfate if she meets criteria  8. S/p betamethasone  9. Neonatology consulted and notified of impending delivery    Subjective/Objective   Chief Complaint:       Subjective:  Patient reports intermittent headaches and epigastric pain, although none currently. She denies any dizziness, nausea, vomiting, chest pain, shortness of breath, palpitations. She has no obstetric complaints.    Objective:     Vitals: Blood pressure 128/84, pulse 74, temperature (!) 97.4 °F (36.3 °C), temperature source Temporal, resp. rate 20, height 5' 4\" (1.626 m), weight 83 kg (183 lb), last menstrual period 2023, not currently breastfeeding.  No intake or output data in the 24 hours ending 24 0817    Physical Exam:     General: NAD  Cardiovascular: RRR, no murmur, nl S1/S2   Lungs: CTAB, non-labored breathing   Abdomen: Soft, no distension/rebound/guarding/tenderness   Lower Extremities: Non-tender    FHT: reactive, category 1    Lab, Imaging and other studies:     Recent Results (from the past 72 hour(s))   Protein / creatinine ratio, urine    Collection Time: 24 10:55 PM "   Result Value Ref Range    Creatinine, Ur 135.6 Reference range not established. mg/dL    Protein Urine Random 453 Reference range not established. mg/dL    Prot/Creat Ratio, Ur 3.34 (H) 0.00 - 0.10   Comprehensive metabolic panel    Collection Time: 05/12/24 11:17 PM   Result Value Ref Range    Sodium 139 135 - 147 mmol/L    Potassium 4.0 3.5 - 5.3 mmol/L    Chloride 108 96 - 108 mmol/L    CO2 20 (L) 21 - 32 mmol/L    ANION GAP 11 4 - 13 mmol/L    BUN 15 5 - 25 mg/dL    Creatinine 0.67 0.60 - 1.30 mg/dL    Glucose 80 65 - 140 mg/dL    Calcium 8.9 8.4 - 10.2 mg/dL    AST 16 13 - 39 U/L    ALT 15 7 - 52 U/L    Alkaline Phosphatase 90 34 - 104 U/L    Total Protein 6.1 (L) 6.4 - 8.4 g/dL    Albumin 3.6 3.5 - 5.0 g/dL    Total Bilirubin 0.24 0.20 - 1.00 mg/dL    eGFR 117 ml/min/1.73sq m   CBC and differential    Collection Time: 05/12/24 11:17 PM   Result Value Ref Range    WBC 18.31 (H) 4.31 - 10.16 Thousand/uL    RBC 4.10 3.81 - 5.12 Million/uL    Hemoglobin 12.7 11.5 - 15.4 g/dL    Hematocrit 36.6 34.8 - 46.1 %    MCV 89 82 - 98 fL    MCH 31.0 26.8 - 34.3 pg    MCHC 34.7 31.4 - 37.4 g/dL    RDW 12.6 11.6 - 15.1 %    MPV 11.1 8.9 - 12.7 fL    Platelets 312 149 - 390 Thousands/uL   Type and screen    Collection Time: 05/12/24 11:17 PM   Result Value Ref Range    ABO Grouping O     Rh Factor Positive     Antibody Screen Negative     Specimen Expiration Date 20240515    Manual Differential(PHLEBS Do Not Order)    Collection Time: 05/12/24 11:17 PM   Result Value Ref Range    Segmented % 67 43 - 75 %    Lymphocytes % 16 14 - 44 %    Monocytes % 8 4 - 12 %    Eosinophils % 3 0 - 6 %    Basophils % 0 0 - 1 %    Metamyelocytes % 4 (H) 0 - 1 %    Myelocytes % 2 (H) 0 - 1 %    Absolute Neutrophils 12.27 (H) 1.85 - 7.62 Thousand/uL    Absolute Lymphocytes 2.93 0.60 - 4.47 Thousand/uL    Absolute Monocytes 1.46 (H) 0.00 - 1.22 Thousand/uL    Absolute Eosinophils 0.55 (H) 0.00 - 0.40 Thousand/uL    Absolute Basophils 0.00 0.00 -  0.10 Thousand/uL    Absolute Metamyelocytes 0.73 (H) 0.00 - 0.10 Thousand/uL    Absolute Myelocytes 0.37 (H) 0.00 - 0.10 Thousand/uL    Total Counted      Platelet Estimate Adequate Adequate    Giant PLTs Present     Large Platelet Present     Anisocytosis Present     Polychromasia Present      Meds:  famotidine, 20 mg, Oral, BID  senna, 1 tablet, Oral, HS      acetaminophen, 650 mg, Q4H PRN  ondansetron, 4 mg, Q8H PRN              Signature / Title: Angelina Altamirano MD, Ob/Gyn  Date: 5/13/2024  Time: 8:17 AM

## 2024-05-13 NOTE — PLAN OF CARE
Problem: PAIN - ADULT  Goal: Verbalizes/displays adequate comfort level or baseline comfort level  Description: Interventions:  - Encourage patient to monitor pain and request assistance  - Assess pain using appropriate pain scale  - Administer analgesics based on type and severity of pain and evaluate response  - Implement non-pharmacological measures as appropriate and evaluate response  - Consider cultural and social influences on pain and pain management  - Notify physician/advanced practitioner if interventions unsuccessful or patient reports new pain  Outcome: Progressing     Problem: INFECTION - ADULT  Goal: Absence or prevention of progression during hospitalization  Description: INTERVENTIONS:  - Assess and monitor for signs and symptoms of infection  - Monitor lab/diagnostic results  - Monitor all insertion sites, i.e. indwelling lines, tubes, and drains  - Monitor endotracheal if appropriate and nasal secretions for changes in amount and color  - [place] appropriate cooling/warming therapies per order  - Administer medications as ordered  - Instruct and encourage patient and family to use good hand hygiene technique  - Identify and instruct in appropriate isolation precautions for identified infection/condition  Outcome: Progressing  Goal: Absence of fever/infection during neutropenic period  Description: INTERVENTIONS:  - Monitor WBC    Outcome: Progressing     Problem: SAFETY ADULT  Goal: Patient will remain free of falls  Description: INTERVENTIONS:  - Educate patient/family on patient safety including physical limitations  - Instruct patient to call for assistance with activity   - Consult OT/PT to assist with strengthening/mobility   - Keep Call bell within reach  - Keep bed low and locked with side rails adjusted as appropriate  - Keep care items and personal belongings within reach  - Initiate and maintain comfort rounds  - Make Fall Risk Sign visible to staff  - Offer Toileting every  Hours,  in advance of need  - Initiate/Maintain alarm  - Obtain necessary fall risk management equipment:   - Apply yellow socks and bracelet for high fall risk patients  - Consider moving patient to room near nurses station  Outcome: Progressing  Goal: Maintain or return to baseline ADL function  Description: INTERVENTIONS:  -  Assess patient's ability to carry out ADLs; assess patient's baseline for ADL function and identify physical deficits which impact ability to perform ADLs (bathing, care of mouth/teeth, toileting, grooming, dressing, etc.)  - Assess/evaluate cause of self-care deficits   - Assess range of motion  - Assess patient's mobility; develop plan if impaired  - Assess patient's need for assistive devices and provide as appropriate  - Encourage maximum independence but intervene and supervise when necessary  - Involve family in performance of ADLs  - Assess for home care needs following discharge   - Consider OT consult to assist with ADL evaluation and planning for discharge  - Provide patient education as appropriate  Outcome: Progressing  Goal: Maintains/Returns to pre admission functional level  Description: INTERVENTIONS:  - Perform AM-PAC 6 Click Basic Mobility/ Daily Activity assessment daily.  - Set and communicate daily mobility goal to care team and patient/family/caregiver.   - Collaborate with rehabilitation services on mobility goals if consulted  - Perform Range of Motion  times a day.  - Reposition patient every  hours.  - Dangle patient  times a day  - Stand patient  times a day  - Ambulate patient  times a day  - Out of bed to chair  times a day   - Out of bed for meals  times a day  - Out of bed for toileting  - Record patient progress and toleration of activity level   Outcome: Progressing     Problem: Knowledge Deficit  Goal: Patient/family/caregiver demonstrates understanding of disease process, treatment plan, medications, and discharge instructions  Description: Complete learning  assessment and assess knowledge base.  Interventions:  - Provide teaching at level of understanding  - Provide teaching via preferred learning methods  Outcome: Progressing     Problem: DISCHARGE PLANNING  Goal: Discharge to home or other facility with appropriate resources  Description: INTERVENTIONS:  - Identify barriers to discharge w/patient and caregiver  - Arrange for needed discharge resources and transportation as appropriate  - Identify discharge learning needs (meds, wound care, etc.)  - Arrange for interpretive services to assist at discharge as needed  - Refer to Case Management Department for coordinating discharge planning if the patient needs post-hospital services based on physician/advanced practitioner order or complex needs related to functional status, cognitive ability, or social support system  Outcome: Progressing     Problem: ANTEPARTUM  Goal: Maintain pregnancy as long as maternal and/or fetal condition is stable  Description: INTERVENTIONS:  - Maternal surveillance  - Fetal surveillance  - Monitor uterine activity  - Medications as ordered  - Bedrest  Outcome: Progressing

## 2024-05-13 NOTE — PLAN OF CARE
Problem: PAIN - ADULT  Goal: Verbalizes/displays adequate comfort level or baseline comfort level  Description: Interventions:  - Encourage patient to monitor pain and request assistance  - Assess pain using appropriate pain scale  - Administer analgesics based on type and severity of pain and evaluate response  - Implement non-pharmacological measures as appropriate and evaluate response  - Consider cultural and social influences on pain and pain management  - Notify physician/advanced practitioner if interventions unsuccessful or patient reports new pain  Outcome: Progressing     Problem: INFECTION - ADULT  Goal: Absence or prevention of progression during hospitalization  Description: INTERVENTIONS:  - Assess and monitor for signs and symptoms of infection  - Monitor lab/diagnostic results  - Monitor all insertion sites, i.e. indwelling lines, tubes, and drains  - Instruct and encourage patient and family to use good hand hygiene technique  - Identify and instruct in appropriate isolation precautions for identified infection/condition  Outcome: Progressing  Goal: Absence of fever/infection during neutropenic period  Description: INTERVENTIONS:  - Monitor WBC    Outcome: Progressing     Problem: SAFETY ADULT  Goal: Patient will remain free of falls  Description: INTERVENTIONS:  - Keep bed low and locked with side rails adjusted as appropriate  - Keep care items and personal belongings within reach  - Initiate and maintain comfort rounds  - Make Fall Risk Sign visible to staff    Outcome: Progressing  Goal: Maintain or return to baseline ADL function  Description: INTERVENTIONS:  - Provide patient education as appropriate  Outcome: Progressing  Goal: Maintains/Returns to pre admission functional level  Description: INTERVENTIONS:  - Record patient progress and toleration of activity level   Outcome: Progressing     Problem: Knowledge Deficit  Goal: Patient/family/caregiver demonstrates understanding of disease  process, treatment plan, medications, and discharge instructions  Description: Complete learning assessment and assess knowledge base.  Interventions:  - Provide teaching at level of understanding  - Provide teaching via preferred learning methods  Outcome: Progressing     Problem: DISCHARGE PLANNING  Goal: Discharge to home or other facility with appropriate resources  Description: INTERVENTIONS:  - Identify barriers to discharge w/patient and caregiver  - Arrange for needed discharge resources and transportation as appropriate  - Identify discharge learning needs (meds, wound care, etc.)  - Arrange for interpretive services to assist at discharge as needed  - Refer to Case Management Department for coordinating discharge planning if the patient needs post-hospital services based on physician/advanced practitioner order or complex needs related to functional status, cognitive ability, or social support system  Outcome: Progressing     Problem: ANTEPARTUM  Goal: Maintain pregnancy as long as maternal and/or fetal condition is stable  Description: INTERVENTIONS:  - Maternal surveillance  - Fetal surveillance  - Monitor uterine activity  - Medications as ordered  - Bedrest  Outcome: Progressing

## 2024-05-13 NOTE — ASSESSMENT & PLAN NOTE
MEASUREMENTS (* Included In Average GA)     AC             26.51 cm        30 weeks 4 days* (13%)  BPD             8.03 cm        32 weeks 1 day * (49%)  HC             28.53 cm        31 weeks 2 days* (6%)  Femur           5.74 cm        30 weeks 1 day * (4%)     HC/AC           1.08 [0.96 - 1.17]                 (60%)  FL/AC             22 [20 - 24]  FL/BPD            71 [71 - 87]  EFW Hadlock 4   1612 grams - 3 lbs 9 oz                 (9%)    Normal dopplers

## 2024-05-13 NOTE — ASSESSMENT & PLAN NOTE
Admit to OB/GYN service  Admission labs wnl  EFW: 9%, 3.5 lbs  VTX by manual exam  GBS pos status: will need PCN for prophylaxis when in labor  She is aware that growth restricted babies will sometimes not tolerate labor and may need a  delivery  Steroid complete  NICU consult

## 2024-05-13 NOTE — ASSESSMENT & PLAN NOTE
QBL 1400, Hgb 12.2 --> post op Hgb 10, venofer ordered > 9.3 > 9.2  Lines: [x] VT  Pain: Tylenol and toradol scheduled, mami 5/10 PRN    FEN: Tolerating regular diet  DVT ppx: SCDs and Lovenox 40mg qD  Passing flatus   Incision C/D/I

## 2024-05-13 NOTE — TELEPHONE ENCOUNTER
"Regardin weeks pregnant /Preeclampsia/  BP  158/99  ----- Message from Nupur Allen sent at 2024  9:43 PM EDT -----  \"I am 38 weeks pregnant and I have Preeclampsia.Current BP  158/99 was told to come in if BP went up. I am also having trouble breathing. I am on my way to [facility]\"    "

## 2024-05-14 ENCOUNTER — ANESTHESIA (INPATIENT)
Dept: PERIOP | Facility: HOSPITAL | Age: 29
End: 2024-05-14
Payer: COMMERCIAL

## 2024-05-14 ENCOUNTER — ANESTHESIA (INPATIENT)
Dept: LABOR AND DELIVERY | Facility: HOSPITAL | Age: 29
End: 2024-05-14
Payer: COMMERCIAL

## 2024-05-14 ENCOUNTER — ANESTHESIA EVENT (INPATIENT)
Dept: PERIOP | Facility: HOSPITAL | Age: 29
End: 2024-05-14
Payer: COMMERCIAL

## 2024-05-14 PROBLEM — Z3A.34 34 WEEKS GESTATION OF PREGNANCY: Status: ACTIVE | Noted: 2023-12-13

## 2024-05-14 PROBLEM — Z98.891 STATUS POST PRIMARY LOW TRANSVERSE CESAREAN SECTION: Status: ACTIVE | Noted: 2023-12-13

## 2024-05-14 LAB
ALBUMIN SERPL BCP-MCNC: 2.7 G/DL (ref 3.5–5)
ALBUMIN SERPL BCP-MCNC: 2.8 G/DL (ref 3.5–5)
ALBUMIN SERPL BCP-MCNC: 3.4 G/DL (ref 3.5–5)
ALP SERPL-CCNC: 83 U/L (ref 34–104)
ALP SERPL-CCNC: 84 U/L (ref 34–104)
ALP SERPL-CCNC: 87 U/L (ref 34–104)
ALT SERPL W P-5'-P-CCNC: 11 U/L (ref 7–52)
ALT SERPL W P-5'-P-CCNC: 8 U/L (ref 7–52)
ALT SERPL W P-5'-P-CCNC: 8 U/L (ref 7–52)
ANION GAP SERPL CALCULATED.3IONS-SCNC: 3 MMOL/L (ref 4–13)
ANION GAP SERPL CALCULATED.3IONS-SCNC: 5 MMOL/L (ref 4–13)
ANION GAP SERPL CALCULATED.3IONS-SCNC: 9 MMOL/L (ref 4–13)
APTT PPP: 27 SECONDS (ref 23–37)
AST SERPL W P-5'-P-CCNC: 14 U/L (ref 13–39)
AST SERPL W P-5'-P-CCNC: 17 U/L (ref 13–39)
AST SERPL W P-5'-P-CCNC: 21 U/L (ref 13–39)
BACTERIA UR CULT: NORMAL
BASE EXCESS BLDCOA CALC-SCNC: -3.9 MMOL/L (ref 3–11)
BASE EXCESS BLDCOV CALC-SCNC: -5 MMOL/L (ref 1–9)
BASOPHILS # BLD AUTO: 0.08 THOUSANDS/ÂΜL (ref 0–0.1)
BASOPHILS NFR BLD AUTO: 0 % (ref 0–1)
BILIRUB SERPL-MCNC: 0.31 MG/DL (ref 0.2–1)
BILIRUB SERPL-MCNC: 0.32 MG/DL (ref 0.2–1)
BILIRUB SERPL-MCNC: 0.35 MG/DL (ref 0.2–1)
BUN SERPL-MCNC: 10 MG/DL (ref 5–25)
BUN SERPL-MCNC: 11 MG/DL (ref 5–25)
BUN SERPL-MCNC: 9 MG/DL (ref 5–25)
CALCIUM ALBUM COR SERPL-MCNC: 6.9 MG/DL (ref 8.3–10.1)
CALCIUM ALBUM COR SERPL-MCNC: 7.3 MG/DL (ref 8.3–10.1)
CALCIUM ALBUM COR SERPL-MCNC: 7.6 MG/DL (ref 8.3–10.1)
CALCIUM SERPL-MCNC: 5.9 MG/DL (ref 8.4–10.2)
CALCIUM SERPL-MCNC: 6.3 MG/DL (ref 8.4–10.2)
CALCIUM SERPL-MCNC: 7.1 MG/DL (ref 8.4–10.2)
CHLORIDE SERPL-SCNC: 103 MMOL/L (ref 96–108)
CHLORIDE SERPL-SCNC: 106 MMOL/L (ref 96–108)
CHLORIDE SERPL-SCNC: 109 MMOL/L (ref 96–108)
CO2 SERPL-SCNC: 18 MMOL/L (ref 21–32)
CO2 SERPL-SCNC: 21 MMOL/L (ref 21–32)
CO2 SERPL-SCNC: 23 MMOL/L (ref 21–32)
CREAT SERPL-MCNC: 0.56 MG/DL (ref 0.6–1.3)
CREAT SERPL-MCNC: 0.6 MG/DL (ref 0.6–1.3)
CREAT SERPL-MCNC: 0.61 MG/DL (ref 0.6–1.3)
EOSINOPHIL # BLD AUTO: 0.14 THOUSAND/ÂΜL (ref 0–0.61)
EOSINOPHIL NFR BLD AUTO: 1 % (ref 0–6)
ERYTHROCYTE [DISTWIDTH] IN BLOOD BY AUTOMATED COUNT: 12.9 % (ref 11.6–15.1)
ERYTHROCYTE [DISTWIDTH] IN BLOOD BY AUTOMATED COUNT: 13 % (ref 11.6–15.1)
ERYTHROCYTE [DISTWIDTH] IN BLOOD BY AUTOMATED COUNT: 13 % (ref 11.6–15.1)
FIBRINOGEN PPP-MCNC: 301 MG/DL (ref 207–520)
GFR SERPL CREATININE-BSD FRML MDRD: 121 ML/MIN/1.73SQ M
GFR SERPL CREATININE-BSD FRML MDRD: 121 ML/MIN/1.73SQ M
GFR SERPL CREATININE-BSD FRML MDRD: 124 ML/MIN/1.73SQ M
GLUCOSE SERPL-MCNC: 78 MG/DL (ref 65–140)
GLUCOSE SERPL-MCNC: 81 MG/DL (ref 65–140)
GLUCOSE SERPL-MCNC: 85 MG/DL (ref 65–140)
HCO3 BLDCOA-SCNC: 22.7 MMOL/L (ref 17.3–27.3)
HCO3 BLDCOV-SCNC: 20.4 MMOL/L (ref 12.2–28.6)
HCT VFR BLD AUTO: 29.3 % (ref 34.8–46.1)
HCT VFR BLD AUTO: 31.7 % (ref 34.8–46.1)
HCT VFR BLD AUTO: 36.6 % (ref 34.8–46.1)
HGB BLD-MCNC: 10 G/DL (ref 11.5–15.4)
HGB BLD-MCNC: 10.7 G/DL (ref 11.5–15.4)
HGB BLD-MCNC: 12.2 G/DL (ref 11.5–15.4)
IMM GRANULOCYTES # BLD AUTO: 0.29 THOUSAND/UL (ref 0–0.2)
IMM GRANULOCYTES NFR BLD AUTO: 1 % (ref 0–2)
INR PPP: 1.09 (ref 0.84–1.19)
LYMPHOCYTES # BLD AUTO: 2.44 THOUSANDS/ÂΜL (ref 0.6–4.47)
LYMPHOCYTES NFR BLD AUTO: 10 % (ref 14–44)
MAGNESIUM SERPL-MCNC: 5.4 MG/DL (ref 1.9–2.7)
MAGNESIUM SERPL-MCNC: 5.4 MG/DL (ref 1.9–2.7)
MAGNESIUM SERPL-MCNC: 5.6 MG/DL (ref 1.9–2.7)
MCH RBC QN AUTO: 31.2 PG (ref 26.8–34.3)
MCH RBC QN AUTO: 31.3 PG (ref 26.8–34.3)
MCH RBC QN AUTO: 31.8 PG (ref 26.8–34.3)
MCHC RBC AUTO-ENTMCNC: 33.3 G/DL (ref 31.4–37.4)
MCHC RBC AUTO-ENTMCNC: 33.8 G/DL (ref 31.4–37.4)
MCHC RBC AUTO-ENTMCNC: 34.1 G/DL (ref 31.4–37.4)
MCV RBC AUTO: 92 FL (ref 82–98)
MCV RBC AUTO: 94 FL (ref 82–98)
MCV RBC AUTO: 94 FL (ref 82–98)
MONOCYTES # BLD AUTO: 1.27 THOUSAND/ÂΜL (ref 0.17–1.22)
MONOCYTES NFR BLD AUTO: 5 % (ref 4–12)
NEUTROPHILS # BLD AUTO: 19.2 THOUSANDS/ÂΜL (ref 1.85–7.62)
NEUTS SEG NFR BLD AUTO: 83 % (ref 43–75)
NRBC BLD AUTO-RTO: 0 /100 WBCS
O2 CT VFR BLDCOA CALC: 5.8 ML/DL
OXYHGB MFR BLDCOA: 26.1 %
OXYHGB MFR BLDCOV: 67.5 %
PCO2 BLDCOA: 46.6 MM[HG] (ref 30–60)
PCO2 BLDCOV: 39.4 MM HG (ref 27–43)
PH BLDCOA: 7.3 [PH] (ref 7.23–7.43)
PH BLDCOV: 7.33 [PH] (ref 7.19–7.49)
PLATELET # BLD AUTO: 236 THOUSANDS/UL (ref 149–390)
PLATELET # BLD AUTO: 260 THOUSANDS/UL (ref 149–390)
PLATELET # BLD AUTO: 272 THOUSANDS/UL (ref 149–390)
PMV BLD AUTO: 10.9 FL (ref 8.9–12.7)
PMV BLD AUTO: 10.9 FL (ref 8.9–12.7)
PMV BLD AUTO: 11.2 FL (ref 8.9–12.7)
PO2 BLDCOA: 14.6 MM HG (ref 5–25)
PO2 BLDCOV: 28.7 MM HG (ref 15–45)
POTASSIUM SERPL-SCNC: 4 MMOL/L (ref 3.5–5.3)
POTASSIUM SERPL-SCNC: 4.3 MMOL/L (ref 3.5–5.3)
POTASSIUM SERPL-SCNC: 4.4 MMOL/L (ref 3.5–5.3)
PROT SERPL-MCNC: 4.5 G/DL (ref 6.4–8.4)
PROT SERPL-MCNC: 4.7 G/DL (ref 6.4–8.4)
PROT SERPL-MCNC: 5.9 G/DL (ref 6.4–8.4)
PROTHROMBIN TIME: 14.4 SECONDS (ref 11.6–14.5)
RBC # BLD AUTO: 3.2 MILLION/UL (ref 3.81–5.12)
RBC # BLD AUTO: 3.37 MILLION/UL (ref 3.81–5.12)
RBC # BLD AUTO: 3.91 MILLION/UL (ref 3.81–5.12)
SAO2 % BLDCOV: 15.3 ML/DL
SODIUM SERPL-SCNC: 127 MMOL/L (ref 135–147)
SODIUM SERPL-SCNC: 134 MMOL/L (ref 135–147)
SODIUM SERPL-SCNC: 136 MMOL/L (ref 135–147)
WBC # BLD AUTO: 18.66 THOUSAND/UL (ref 4.31–10.16)
WBC # BLD AUTO: 23.42 THOUSAND/UL (ref 4.31–10.16)
WBC # BLD AUTO: 26.86 THOUSAND/UL (ref 4.31–10.16)

## 2024-05-14 PROCEDURE — 85025 COMPLETE CBC W/AUTO DIFF WBC: CPT

## 2024-05-14 PROCEDURE — NC001 PR NO CHARGE: Performed by: NURSE PRACTITIONER

## 2024-05-14 PROCEDURE — 83735 ASSAY OF MAGNESIUM: CPT

## 2024-05-14 PROCEDURE — 88307 TISSUE EXAM BY PATHOLOGIST: CPT | Performed by: PATHOLOGY

## 2024-05-14 PROCEDURE — 80053 COMPREHEN METABOLIC PANEL: CPT

## 2024-05-14 PROCEDURE — 59514 CESAREAN DELIVERY ONLY: CPT | Performed by: OBSTETRICS & GYNECOLOGY

## 2024-05-14 PROCEDURE — 82805 BLOOD GASES W/O2 SATURATION: CPT | Performed by: OBSTETRICS & GYNECOLOGY

## 2024-05-14 PROCEDURE — 10D17ZZ EXTRACTION OF PRODUCTS OF CONCEPTION, RETAINED, VIA NATURAL OR ARTIFICIAL OPENING: ICD-10-PCS | Performed by: OBSTETRICS & GYNECOLOGY

## 2024-05-14 PROCEDURE — 85610 PROTHROMBIN TIME: CPT

## 2024-05-14 PROCEDURE — 85730 THROMBOPLASTIN TIME PARTIAL: CPT

## 2024-05-14 PROCEDURE — 3E033VJ INTRODUCTION OF OTHER HORMONE INTO PERIPHERAL VEIN, PERCUTANEOUS APPROACH: ICD-10-PCS | Performed by: OBSTETRICS & GYNECOLOGY

## 2024-05-14 PROCEDURE — 85384 FIBRINOGEN ACTIVITY: CPT

## 2024-05-14 PROCEDURE — NC001 PR NO CHARGE: Performed by: OBSTETRICS & GYNECOLOGY

## 2024-05-14 PROCEDURE — 85027 COMPLETE CBC AUTOMATED: CPT

## 2024-05-14 PROCEDURE — 59160 D & C AFTER DELIVERY: CPT | Performed by: OBSTETRICS & GYNECOLOGY

## 2024-05-14 RX ORDER — ROCURONIUM BROMIDE 10 MG/ML
INJECTION, SOLUTION INTRAVENOUS AS NEEDED
Status: DISCONTINUED | OUTPATIENT
Start: 2024-05-14 | End: 2024-05-14

## 2024-05-14 RX ORDER — OXYCODONE HYDROCHLORIDE 5 MG/1
5 TABLET ORAL EVERY 4 HOURS PRN
Status: DISCONTINUED | OUTPATIENT
Start: 2024-05-14 | End: 2024-05-16 | Stop reason: SDUPTHER

## 2024-05-14 RX ORDER — LIDOCAINE HCL/PF 100 MG/5ML
SYRINGE (ML) INJECTION AS NEEDED
Status: DISCONTINUED | OUTPATIENT
Start: 2024-05-14 | End: 2024-05-14

## 2024-05-14 RX ORDER — CARBOPROST TROMETHAMINE 250 UG/ML
INJECTION, SOLUTION INTRAMUSCULAR AS NEEDED
Status: DISCONTINUED | OUTPATIENT
Start: 2024-05-14 | End: 2024-05-14

## 2024-05-14 RX ORDER — MAGNESIUM HYDROXIDE 1200 MG/15ML
LIQUID ORAL AS NEEDED
Status: DISCONTINUED | OUTPATIENT
Start: 2024-05-14 | End: 2024-05-14 | Stop reason: HOSPADM

## 2024-05-14 RX ORDER — MORPHINE SULFATE 0.5 MG/ML
INJECTION, SOLUTION EPIDURAL; INTRATHECAL; INTRAVENOUS AS NEEDED
Status: DISCONTINUED | OUTPATIENT
Start: 2024-05-14 | End: 2024-05-14

## 2024-05-14 RX ORDER — CEFAZOLIN SODIUM 2 G/50ML
SOLUTION INTRAVENOUS AS NEEDED
Status: DISCONTINUED | OUTPATIENT
Start: 2024-05-14 | End: 2024-05-14

## 2024-05-14 RX ORDER — NALOXONE HYDROCHLORIDE 0.4 MG/ML
0.1 INJECTION, SOLUTION INTRAMUSCULAR; INTRAVENOUS; SUBCUTANEOUS
Status: ACTIVE | OUTPATIENT
Start: 2024-05-14 | End: 2024-05-15

## 2024-05-14 RX ORDER — OXYTOCIN/RINGER'S LACTATE 30/500 ML
PLASTIC BAG, INJECTION (ML) INTRAVENOUS
Status: COMPLETED
Start: 2024-05-14 | End: 2024-05-14

## 2024-05-14 RX ORDER — ENOXAPARIN SODIUM 100 MG/ML
40 INJECTION SUBCUTANEOUS DAILY
Status: DISCONTINUED | OUTPATIENT
Start: 2024-05-15 | End: 2024-05-18 | Stop reason: HOSPADM

## 2024-05-14 RX ORDER — DIPHENHYDRAMINE HYDROCHLORIDE 50 MG/ML
25 INJECTION INTRAMUSCULAR; INTRAVENOUS ONCE
Status: COMPLETED | OUTPATIENT
Start: 2024-05-14 | End: 2024-05-14

## 2024-05-14 RX ORDER — FENTANYL CITRATE/PF 50 MCG/ML
25 SYRINGE (ML) INJECTION
Status: DISCONTINUED | OUTPATIENT
Start: 2024-05-14 | End: 2024-05-14 | Stop reason: HOSPADM

## 2024-05-14 RX ORDER — PHENYLEPHRINE HCL IN 0.9% NACL 1 MG/10 ML
SYRINGE (ML) INTRAVENOUS AS NEEDED
Status: DISCONTINUED | OUTPATIENT
Start: 2024-05-14 | End: 2024-05-14

## 2024-05-14 RX ORDER — CEFAZOLIN SODIUM 2 G/50ML
2000 SOLUTION INTRAVENOUS EVERY 8 HOURS
Status: DISCONTINUED | OUTPATIENT
Start: 2024-05-14 | End: 2024-05-14

## 2024-05-14 RX ORDER — OXYTOCIN/RINGER'S LACTATE 30/500 ML
250 PLASTIC BAG, INJECTION (ML) INTRAVENOUS ONCE
Status: DISCONTINUED | OUTPATIENT
Start: 2024-05-14 | End: 2024-05-18 | Stop reason: HOSPADM

## 2024-05-14 RX ORDER — OXYTOCIN/RINGER'S LACTATE 30/500 ML
PLASTIC BAG, INJECTION (ML) INTRAVENOUS
Status: DISPENSED
Start: 2024-05-14 | End: 2024-05-14

## 2024-05-14 RX ORDER — OXYTOCIN/RINGER'S LACTATE 30/500 ML
PLASTIC BAG, INJECTION (ML) INTRAVENOUS CONTINUOUS PRN
Status: DISCONTINUED | OUTPATIENT
Start: 2024-05-14 | End: 2024-05-14

## 2024-05-14 RX ORDER — KETOROLAC TROMETHAMINE 30 MG/ML
15 INJECTION, SOLUTION INTRAMUSCULAR; INTRAVENOUS EVERY 6 HOURS PRN
Status: DISPENSED | OUTPATIENT
Start: 2024-05-14 | End: 2024-05-15

## 2024-05-14 RX ORDER — PROPOFOL 10 MG/ML
INJECTION, EMULSION INTRAVENOUS AS NEEDED
Status: DISCONTINUED | OUTPATIENT
Start: 2024-05-14 | End: 2024-05-14

## 2024-05-14 RX ORDER — CALCIUM GLUCONATE 20 MG/ML
2 INJECTION, SOLUTION INTRAVENOUS ONCE
Status: DISCONTINUED | OUTPATIENT
Start: 2024-05-14 | End: 2024-05-14

## 2024-05-14 RX ORDER — CEFAZOLIN SODIUM 2 G/50ML
2000 SOLUTION INTRAVENOUS ONCE
Qty: 50 ML | Refills: 0 | Status: COMPLETED | OUTPATIENT
Start: 2024-05-14 | End: 2024-05-14

## 2024-05-14 RX ORDER — OXYTOCIN/RINGER'S LACTATE 30/500 ML
62.5 PLASTIC BAG, INJECTION (ML) INTRAVENOUS ONCE
Status: COMPLETED | OUTPATIENT
Start: 2024-05-14 | End: 2024-05-14

## 2024-05-14 RX ORDER — EPHEDRINE SULFATE 50 MG/ML
INJECTION INTRAVENOUS AS NEEDED
Status: DISCONTINUED | OUTPATIENT
Start: 2024-05-14 | End: 2024-05-14

## 2024-05-14 RX ORDER — NALBUPHINE HYDROCHLORIDE 10 MG/ML
5 INJECTION, SOLUTION INTRAMUSCULAR; INTRAVENOUS; SUBCUTANEOUS
Status: DISPENSED | OUTPATIENT
Start: 2024-05-14 | End: 2024-05-15

## 2024-05-14 RX ORDER — ONDANSETRON 2 MG/ML
4 INJECTION INTRAMUSCULAR; INTRAVENOUS ONCE AS NEEDED
Status: DISCONTINUED | OUTPATIENT
Start: 2024-05-14 | End: 2024-05-14 | Stop reason: SDUPTHER

## 2024-05-14 RX ORDER — ONDANSETRON 2 MG/ML
4 INJECTION INTRAMUSCULAR; INTRAVENOUS ONCE AS NEEDED
Status: DISCONTINUED | OUTPATIENT
Start: 2024-05-14 | End: 2024-05-14 | Stop reason: HOSPADM

## 2024-05-14 RX ORDER — FENTANYL CITRATE 50 UG/ML
INJECTION, SOLUTION INTRAMUSCULAR; INTRAVENOUS AS NEEDED
Status: DISCONTINUED | OUTPATIENT
Start: 2024-05-14 | End: 2024-05-14

## 2024-05-14 RX ORDER — SUCCINYLCHOLINE/SOD CL,ISO/PF 100 MG/5ML
SYRINGE (ML) INTRAVENOUS AS NEEDED
Status: DISCONTINUED | OUTPATIENT
Start: 2024-05-14 | End: 2024-05-14

## 2024-05-14 RX ORDER — FENTANYL CITRATE 50 UG/ML
INJECTION, SOLUTION INTRAMUSCULAR; INTRAVENOUS
Status: COMPLETED
Start: 2024-05-14 | End: 2024-05-14

## 2024-05-14 RX ORDER — ACETAMINOPHEN 325 MG/1
325 TABLET ORAL ONCE
Status: COMPLETED | OUTPATIENT
Start: 2024-05-14 | End: 2024-05-14

## 2024-05-14 RX ORDER — CALCIUM GLUCONATE 20 MG/ML
1 INJECTION, SOLUTION INTRAVENOUS ONCE
Status: COMPLETED | OUTPATIENT
Start: 2024-05-14 | End: 2024-05-14

## 2024-05-14 RX ORDER — MISOPROSTOL 200 UG/1
1000 TABLET ORAL ONCE
Status: COMPLETED | OUTPATIENT
Start: 2024-05-14 | End: 2024-05-14

## 2024-05-14 RX ORDER — ONDANSETRON 2 MG/ML
INJECTION INTRAMUSCULAR; INTRAVENOUS
Status: COMPLETED
Start: 2024-05-14 | End: 2024-05-14

## 2024-05-14 RX ORDER — ONDANSETRON 2 MG/ML
4 INJECTION INTRAMUSCULAR; INTRAVENOUS EVERY 6 HOURS PRN
Status: ACTIVE | OUTPATIENT
Start: 2024-05-14 | End: 2024-05-15

## 2024-05-14 RX ORDER — TRANEXAMIC ACID 10 MG/ML
1000 INJECTION, SOLUTION INTRAVENOUS ONCE
Status: COMPLETED | OUTPATIENT
Start: 2024-05-14 | End: 2024-05-14

## 2024-05-14 RX ORDER — ACETAMINOPHEN 325 MG/1
975 TABLET ORAL EVERY 8 HOURS PRN
Status: DISCONTINUED | OUTPATIENT
Start: 2024-05-14 | End: 2024-05-18 | Stop reason: HOSPADM

## 2024-05-14 RX ORDER — MORPHINE SULFATE 0.5 MG/ML
INJECTION, SOLUTION EPIDURAL; INTRATHECAL; INTRAVENOUS
Status: COMPLETED
Start: 2024-05-14 | End: 2024-05-14

## 2024-05-14 RX ORDER — CEFAZOLIN SODIUM 2 G/50ML
2000 SOLUTION INTRAVENOUS EVERY 8 HOURS
Status: DISCONTINUED | OUTPATIENT
Start: 2024-05-14 | End: 2024-05-15

## 2024-05-14 RX ORDER — OXYCODONE HYDROCHLORIDE 5 MG/1
10 TABLET ORAL EVERY 4 HOURS PRN
Status: DISCONTINUED | OUTPATIENT
Start: 2024-05-15 | End: 2024-05-18 | Stop reason: HOSPADM

## 2024-05-14 RX ORDER — FENTANYL CITRATE/PF 50 MCG/ML
25 SYRINGE (ML) INJECTION
Status: DISCONTINUED | OUTPATIENT
Start: 2024-05-14 | End: 2024-05-14 | Stop reason: SDUPTHER

## 2024-05-14 RX ORDER — PHENYLEPHRINE HYDROCHLORIDE 10 MG/ML
INJECTION INTRAVENOUS
Status: DISPENSED
Start: 2024-05-14 | End: 2024-05-14

## 2024-05-14 RX ORDER — BENZOCAINE/MENTHOL 6 MG-10 MG
1 LOZENGE MUCOUS MEMBRANE DAILY PRN
Status: DISCONTINUED | OUTPATIENT
Start: 2024-05-14 | End: 2024-05-18 | Stop reason: HOSPADM

## 2024-05-14 RX ORDER — BUPIVACAINE HYDROCHLORIDE AND EPINEPHRINE 2.5; 5 MG/ML; UG/ML
INJECTION, SOLUTION EPIDURAL; INFILTRATION; INTRACAUDAL; PERINEURAL AS NEEDED
Status: DISCONTINUED | OUTPATIENT
Start: 2024-05-14 | End: 2024-05-14

## 2024-05-14 RX ORDER — OXYCODONE HYDROCHLORIDE 5 MG/1
5 TABLET ORAL EVERY 4 HOURS PRN
Status: DISCONTINUED | OUTPATIENT
Start: 2024-05-15 | End: 2024-05-18 | Stop reason: HOSPADM

## 2024-05-14 RX ADMIN — Medication 62.5 MILLI-UNITS/MIN: at 11:45

## 2024-05-14 RX ADMIN — CEFAZOLIN SODIUM 2000 MG: 2 SOLUTION INTRAVENOUS at 09:03

## 2024-05-14 RX ADMIN — CEFAZOLIN SODIUM 2000 MG: 2 SOLUTION INTRAVENOUS at 13:39

## 2024-05-14 RX ADMIN — CEFAZOLIN SODIUM 2000 MG: 2 SOLUTION INTRAVENOUS at 21:41

## 2024-05-14 RX ADMIN — Medication 100 MG: at 13:29

## 2024-05-14 RX ADMIN — FENTANYL CITRATE 25 MCG: 50 INJECTION INTRAMUSCULAR; INTRAVENOUS at 14:19

## 2024-05-14 RX ADMIN — KETOROLAC TROMETHAMINE 15 MG: 30 INJECTION, SOLUTION INTRAMUSCULAR; INTRAVENOUS at 17:37

## 2024-05-14 RX ADMIN — BUPIVACAINE HYDROCHLORIDE AND EPINEPHRINE BITARTRATE 1.6 ML: 2.5; .005 INJECTION, SOLUTION EPIDURAL; INFILTRATION; INTRACAUDAL; PERINEURAL at 08:53

## 2024-05-14 RX ADMIN — MORPHINE SULFATE 0.15 MG: 0.5 INJECTION, SOLUTION EPIDURAL; INTRATHECAL; INTRAVENOUS at 08:53

## 2024-05-14 RX ADMIN — CARBOPROST TROMETHAMINE 250 MCG: 250 INJECTION, SOLUTION INTRAMUSCULAR at 13:54

## 2024-05-14 RX ADMIN — CEFAZOLIN SODIUM 2000 MG: 2 SOLUTION INTRAVENOUS at 12:55

## 2024-05-14 RX ADMIN — LIDOCAINE HYDROCHLORIDE 100 MG: 20 INJECTION INTRAVENOUS at 13:29

## 2024-05-14 RX ADMIN — EPHEDRINE SULFATE 10 MG: 50 INJECTION, SOLUTION INTRAVENOUS at 13:46

## 2024-05-14 RX ADMIN — TRANEXAMIC ACID 1000 MG: 10 INJECTION, SOLUTION INTRAVENOUS at 12:41

## 2024-05-14 RX ADMIN — MISOPROSTOL 1000 MCG: 200 TABLET ORAL at 12:45

## 2024-05-14 RX ADMIN — Medication 250 MILLI-UNITS/MIN: at 09:15

## 2024-05-14 RX ADMIN — Medication 25 MCG: at 10:31

## 2024-05-14 RX ADMIN — CALCIUM GLUCONATE 1 G: 20 INJECTION, SOLUTION INTRAVENOUS at 21:09

## 2024-05-14 RX ADMIN — FENTANYL CITRATE 15 MCG: 50 INJECTION INTRAMUSCULAR; INTRAVENOUS at 08:53

## 2024-05-14 RX ADMIN — DIPHENHYDRAMINE HYDROCHLORIDE 25 MG: 50 INJECTION, SOLUTION INTRAMUSCULAR; INTRAVENOUS at 04:15

## 2024-05-14 RX ADMIN — SODIUM CHLORIDE, SODIUM LACTATE, POTASSIUM CHLORIDE, AND CALCIUM CHLORIDE: .6; .31; .03; .02 INJECTION, SOLUTION INTRAVENOUS at 09:18

## 2024-05-14 RX ADMIN — ROCURONIUM BROMIDE 30 MG: 10 INJECTION, SOLUTION INTRAVENOUS at 13:43

## 2024-05-14 RX ADMIN — FENTANYL CITRATE 25 MCG: 50 INJECTION INTRAMUSCULAR; INTRAVENOUS at 14:27

## 2024-05-14 RX ADMIN — OXYTOCIN 62.5 MILLI-UNITS/MIN: 10 INJECTION INTRAVENOUS at 11:45

## 2024-05-14 RX ADMIN — ACETAMINOPHEN 325 MG: 325 TABLET, FILM COATED ORAL at 00:10

## 2024-05-14 RX ADMIN — FENTANYL CITRATE 25 MCG: 50 INJECTION INTRAMUSCULAR; INTRAVENOUS at 10:31

## 2024-05-14 RX ADMIN — KETOROLAC TROMETHAMINE 15 MG: 30 INJECTION, SOLUTION INTRAMUSCULAR; INTRAVENOUS at 23:37

## 2024-05-14 RX ADMIN — Medication 30 MCG: at 08:53

## 2024-05-14 RX ADMIN — SUGAMMADEX 200 MG: 100 INJECTION, SOLUTION INTRAVENOUS at 13:56

## 2024-05-14 RX ADMIN — PROPOFOL 200 MG: 10 INJECTION, EMULSION INTRAVENOUS at 13:29

## 2024-05-14 RX ADMIN — PHENYLEPHRINE HYDROCHLORIDE 30 MCG/MIN: 10 INJECTION INTRAVENOUS at 09:00

## 2024-05-14 RX ADMIN — ACETAMINOPHEN 975 MG: 325 TABLET, FILM COATED ORAL at 23:38

## 2024-05-14 RX ADMIN — MAGNESIUM SULFATE HEPTAHYDRATE 2 G/HR: 40 INJECTION, SOLUTION INTRAVENOUS at 15:29

## 2024-05-14 RX ADMIN — ONDANSETRON 4 MG: 2 INJECTION INTRAMUSCULAR; INTRAVENOUS at 09:02

## 2024-05-14 RX ADMIN — METOCLOPRAMIDE 10 MG: 10 TABLET ORAL at 04:15

## 2024-05-14 RX ADMIN — KETOROLAC TROMETHAMINE 15 MG: 30 INJECTION, SOLUTION INTRAMUSCULAR; INTRAVENOUS at 11:50

## 2024-05-14 RX ADMIN — MAGNESIUM SULFATE HEPTAHYDRATE 2 G/HR: 40 INJECTION, SOLUTION INTRAVENOUS at 02:48

## 2024-05-14 RX ADMIN — ACETAMINOPHEN 975 MG: 325 TABLET, FILM COATED ORAL at 18:15

## 2024-05-14 NOTE — PROGRESS NOTES
Patient assessed at bedside, currently feels well with mild dizziness and eyes feel like they are crossed. Decreasing magnesium level to 1mg/hr. Bakri balloon decreased by 50cc, and tolerated well. Will reassess.    Candice DAWSON PGY2   98.9

## 2024-05-14 NOTE — ANESTHESIA PREPROCEDURE EVALUATION
Procedure:  EXAM UNDER ANESTHESIA (EUA) (Vagina )  SUCTION DILATATION AND CURETTAGE (D&C) WITH BALLON PLACEMENT (Uterus)    Relevant Problems   CARDIO   (+) Pre-eclampsia during pregnancy in third trimester, antepartum   (+) Preeclampsia      GI/HEPATIC   (+) Focal nodular hyperplasia of liver      NEURO/PSYCH   (+) Headache in pregnancy, antepartum, third trimester        Physical Exam    Airway    Mallampati score: II         Dental       Cardiovascular  Rhythm: regular, Rate: normal    Pulmonary   Breath sounds clear to auscultation    Other Findings  post-pubertal.      Anesthesia Plan  ASA Score- 2     Anesthesia Type- general with ASA Monitors.         Additional Monitors:     Airway Plan:            Plan Factors-Exercise tolerance (METS): >4 METS.    Chart reviewed.   Existing labs reviewed. Patient summary reviewed.    Patient is not a current smoker.              Induction- intravenous.    Postoperative Plan- Plan for postoperative opioid use.     Informed Consent- Anesthetic plan and risks discussed with patient.

## 2024-05-14 NOTE — ANESTHESIA PREPROCEDURE EVALUATION
Procedure:   SECTION () (Uterus)    Relevant Problems   CARDIO   (+) Pre-eclampsia during pregnancy in third trimester, antepartum   (+) Preeclampsia      GI/HEPATIC   (+) Focal nodular hyperplasia of liver      GYN   (+) 34 weeks gestation of pregnancy      NEURO/PSYCH   (+) Headache in pregnancy, antepartum, third trimester        Physical Exam    Airway    Mallampati score: II         Dental       Cardiovascular  Rhythm: regular, Rate: normal    Pulmonary   Breath sounds clear to auscultation    Other Findings  post-pubertal.      Anesthesia Plan  ASA Score- 3 Emergent    Anesthesia Type- spinal with ASA Monitors.         Additional Monitors:     Airway Plan:            Plan Factors-Exercise tolerance (METS): >4 METS.    Chart reviewed.   Existing labs reviewed. Patient summary reviewed.    Patient is not a current smoker.      Obstructive sleep apnea risk education given perioperatively.        Induction- intravenous.    Postoperative Plan-     Informed Consent- Anesthetic plan and risks discussed with patient.

## 2024-05-14 NOTE — NURSING NOTE
"Entered patients room for Q1hour check post C/S. Patient appeared visibly pale and mother of patient verbalized patient was not feeling well. Patient also stated she \"feels like she is going to pass out\". Assessed patient's bleeding and saw many large clots, assessed fundus to be -2 and firm. Vitals were BP 93/64, HR69 and O2 96. Called Dr. Devlin to bedside.   "

## 2024-05-14 NOTE — ANESTHESIA POSTPROCEDURE EVALUATION
"Post-Op Assessment Note    CV Status:  Stable  Pain Score: 0    Pain management: adequate       Mental Status:  Alert and awake   Hydration Status:  Euvolemic   PONV Controlled:  Controlled   Airway Patency:  Patent     Post Op Vitals Reviewed: Yes    No anethesia notable event occurred.            /82 (BP Location: Right arm)   Pulse 74   Temp 97.6 °F (36.4 °C) (Oral)   Resp 18   Ht 5' 4\" (1.626 m)   Wt 83 kg (183 lb)   LMP 09/19/2023   SpO2 97%   BMI 31.41 kg/m²       BP      Temp      Pulse     Resp      SpO2        "

## 2024-05-14 NOTE — PROGRESS NOTES
"Called to see patient due to passage of clots and continued bleeding. Large clots noted to be passed.  BP 93/64 (BP Location: Right arm)   Pulse 69   Temp 97.6 °F (36.4 °C) (Oral)   Resp 16   Ht 5' 4\" (1.626 m)   Wt 83 kg (183 lb)   LMP 09/19/2023   SpO2 96%   Breastfeeding Unknown   BMI 31.41 kg/m²   TXA and Cytotec 1000 mcg placed. Bedside US still shows clots within uterus.  Patient agreed and consented to Exam under anesthesia, D and C, placement of uterine tamponade balloon, possible laparotomy, possible hysterectomy. Notified anesthesia and L and D team.     ml  CBC, PT, PTT fibrinogen ordered.  Type and cross x 2 units      "

## 2024-05-14 NOTE — LACTATION NOTE
This note was copied from a baby's chart.  CONSULT - LACTATION  Baby Girl Garduno (Lauryn) 0 days female MRN: [id number]    Former[facility] NICU Room / Bed: NICU_15/NICU_15 Encounter: [id number]    Maternal Information     MOTHER:  Eva Garduno  Maternal Age: 29 y.o.   OB History: # 1 - Date: 24, Sex: Female, Weight: 1850 g (4 lb 1.3 oz), GA: 34w0d, Delivery: , Low Transverse, Apgar1: 8, Apgar5: 8, Living: Living, Birth Comments: None   Previouse breast reduction surgery? No    Lactation history:   Has patient previously breast fed: No   How long had patient previously breast fed:     Previous breast feeding complications:       Past Surgical History:   Procedure Laterality Date    FRENULECTOMY, LINGUAL      UT TONSILLECTOMY & ADENOIDECTOMY AGE 10/> N/A 10/3/2018    Procedure: TONSILLECTOMY;  Surgeon: Bradley Leon MD;  Location: [facility];  Service: ENT    WISDOM TOOTH EXTRACTION          Birth information:  YOB: 2026   Time of birth: 9:13 AM   Sex: female   Delivery type: , Low Transverse   Birth Weight: 1850 g (4 lb 1.3 oz)   Percent of Weight Change: -1%     Gestational Age: 34w0d   [unfilled]    Assessment     Breast and nipple assessment: normal assessment     Assessment:  as per 34 week GIRL in NICU    Feeding assessment:  as per NICU    LATCH:  Latch:     Audible Swallowing:     Type of Nipple:     Comfort (Breast/Nipple):     Hold (Positioning):     LATCH Score:            Feeding recommendations:  pump every 2-3 hours      In to see family of 34 week GIRL in NICU Instructions given on pumping.  Discussed when to start, frequency, different pumps available versus manual expression.    Discussed hygiene of hands and supplies as well as assembly, placement of flanges, size of flanged, preparing the breast and cycles and suction settings on pump.    Demonstrated use of hand pump.    Discussed labeling of milk, storage, and  preparation of stored milk.    Also reviewed Ready, Set Baby, NICU and  discharge breastfeeding booklets including the feeding log. Emphasized 8 or more (12) feedings in a 24 hour period, what to expect for the number of diapers per day of life and the progression of properties of the  stooling pattern.    List of reasons to call a lactation consultant.  Feeding logs  Feeding cues  Hand expression  Baby's Second day (cluster feeding)  Breastfeeding and Your Lifestyle (Medications, Alcohol, Caffeine, Smoking, Street Drugs, Methadone)  First Two Weeks Survival Guide for Breastfeeding  Breast Changes  Physical Therapy  Storage and Handling of Breast milk  How to Keep Your Breast Pump Kit Clean  The Employed Breastfeeding Mother  Mixed feeding  Bottle feeding like breastfeeding (paced bottle feeding)  astfeeding and your lifestyle, storage and preparation of breast milk, how to keep you breast pump clean, the employed breastfeeding mother and paced bottle feeding handouts.     Booklet included Breastfeeding Resources for after discharge including access to the number for the [facility]. Family supportive at bedside    Encouraged parents to call for assistance, questions, and concerns about breastfeeding.  Extension provided.        Sarai Rodríguez RN 2024 1:21 PM

## 2024-05-14 NOTE — OP NOTE
OPERATIVE REPORT  PATIENT NAME: Eva Garduno    :  1995  MRN: [id number]  Pt Location: [facility] OR ROOM 07    SURGERY DATE: 2024    Surgeons and Role:     * Elaina Amin MD - Primary     * Kiko Collins MD - Assisting    Preop Diagnosis:  Delayed postpartum hemorrhage [O72.2]    Post-Op Diagnosis Codes:     * Delayed postpartum hemorrhage [O72.2]    Procedure(s) (LRB):  EXAM UNDER ANESTHESIA (EUA) (N/A)  SUCTION DILATATION AND CURETTAGE (D&C) WITH BALLON PLACEMENT (N/A)    Specimen(s):  * No specimens in log *    Total QBL pending        Drains:  Urethral Catheter Double-lumen 16 Fr. (Active)   Reasons to continue Urinary Catheter  Post-operative urological requirements 24 1015   Securement Method Other (Comment) 24 1125   Number of days: 0       Anesthesia Type:   General    Operative Indications:  Delayed postpartum hemorrhage [O72.2]  Lower uterine segment atony    Operative Findings:  Cervix dilated up to 1 cm  Moderate amount of bleeding with clots noted in the vagina  Bedside ultrasound shows empty uterine fundus with thin endometrial stripe but lower uterine segment filled with blood clots  No free fluid behind the uterus    Complications:   None    Procedure and Technique:  Patient was taken to the operating room where general anesthesia was administered.  She was placed in dorsal lithotomy position using yellowfin stirrups.  Patient had an indwelling Kwon catheter.  Patient was given preoperative antibiotics.  Perineum and vagina was prepped with hexedine solution.  Bedside ultrasound was performed confirming lower uterine segment atony.  Weighted speculum was inserted.    Anterior lip of the cervix was grasped with ring forceps.  Using a suction curettage device, 12 mm curved tip was inserted under ultrasound guidance to empty the uterus of clots and any potential retained products of conception.  Uterus was noted to be empty post curettage.  The entire procedure was performed  under ultrasound guidance.  Bakri balloon was inserted and placed in the mid to lower uterine segment.  This was inflated with 200 mL of normal saline.  Correct placement was confirmed via ultrasound.    Patient received a dose of Hemabate in the operating room.  There was note that three 200 mcg of misoprostol were expelled rectally and this was replaced with a new 600 mcg dose of misoprostol.    The Bakri balloon was attached to a Kwon bag.  Minimal bleeding was noted at the end of the procedure.  The uterus was noted to be at umbilicus -1           I was present for the entire procedure.    Patient Disposition:  PACU  and hemodynamically stable        SIGNATURE: Elaina Amin MD  DATE: May 14, 2024  TIME: 2:14 PM

## 2024-05-14 NOTE — OP NOTE
OPERATIVE REPORT  PATIENT NAME: Eva Garduno    :  1995  MRN: [id number]  Pt Location: [facility]OM 01    SURGERY DATE: 2024    Surgeons and Role:     * Elaina Amin MD - Primary     * Clare Devlin MD - Assisting    Preop Diagnosis:  Pregnancy, , 34 weeks  Fetal growth restriction  Preeclampsia with severe features  Maternal request for  delivery, declines trial of labor      Postop Diagnosis:   Same    Procedure(s) (LRB):   SECTION () (N/A)  Low transverse  section via Pfannenstiel skin incision      Specimen(s):  ID Type Source Tests Collected by Time Destination   1 : FOR PATHOLOGY Tissue (Placenta on Hold) OB Only Placenta TISSUE EXAM OB (PLACENTA) ONLY Elaina Amin MD 2024 0720    A :  Cord Blood Cord BLOOD GAS, VENOUS, CORD, BLOOD GAS, ARTERIAL, CORD Elaina Amin MD 2024 0720        Surgical QBL:  Surgical QBL (mL): 353 mL      Drains:  Urethral Catheter Double-lumen 16 Fr. (Active)   Number of days: 0       Anesthesia Type:   Spinal    Operative Indications:  Maternal request     Morteza Group Classification System:  No Multiple pregnancy, No Transverse or oblique lie, No Breech lie, Gestational age is < 37 weeks +  is MORTEZA GROUP 10    Operative Findings:  Female infant vertex presentation  Weight 1850 g  Clear amniotic fluid  Normal placenta  Normal bilateral ovaries and fallopian tubes  Normal uterus      Complications:   None    Procedure and Technique:    The patient was correctly identified and was taken to the OR where spinal was obtained without difficulty.    A Kwon catheter was aseptically placed. She also had pneumatic compression boots placed. Her vagina was prepped with Chlorhexidine. Her abdomen was then prepped using Chloraprep and was draped in the normal sterile fashion in the dorsal supine position with a leftward tilt.  Anesthesia was tested and was found to be adequate.      A Pfannensteil skin incision was  then made with the scapel and carried through to the underlying layer of fascia with the scalpel.      The fascia was incised in the midline and the incision extended laterally.    The underlying rectus muscles were dissected off bluntly and sharply.  The rectus muscles were then  in the midline, and the peritoneum was identified and entered bluntly.    The peritoneal incision was extended with good visualization of the bladder.      The Hernando-O retractor was inserted.     The lower uterine segment incised in a transverse fashion with the scalpel. The uterine incision was extended in a cephalocaudad fashion by blunt dissection. Clear fluid was noted. The vertex was presenting.    The infant's head was grasped, stabilized and was delivered through the uterine incision using gentle fundal pressure.  The rest of the body was delivered without difficulty. There was no shoulder dystocia encountered. The nose and mouth were bulb suctioned, and the cord was doubly clamped and cut. Delayed cord clamping was performed for 60 seconds. The infant had spontaneous cry, color and tone. The infant was handed to a waiting nurse resuscitator and neonatologist. The fetus was delivered atraumatically.     Cord gases, both arterial & venous as well as cord blood was sent to pathology for analysis.     The placenta was gently removed from the patients uterus using gentle traction. Oxytocin infusion was started at this point to control postpartum bleeding and uterine atony. Placenta was sent to pathology.    The uterus was cleared of all clots and debris and curetted using a sponge.     The uterus was closed in a 2 layer fashion in situ. The first layer was repaired with  0 Vicryl in a running, interlocking stitch.  The second layer was closed using an imbricating stitch of the same suture was used to obtain excellent hemostasis.       The uterus, bilateral tubes and ovaries appeared normal.   Careful inspection of the uterine  incision line revealed excellent hemostasis.      Rectus muscle was reapproximated using figure-of-eight suture of chromic 0.    The fascia was reapproximated with 0 Vicryl in a running fashion.      The subcutaneous layer was flushed with warm NSS.     Hemostasis of bleeding small blood vessels was done using electrocautery.     Subcutaneous layer was reappoximated with running suture of plain 3.0.     The skin was closed using running subcuticular sutures of Stratafix Monocryl.  Exofin was applied over the skin incision.     Drapes were removed and patient was cleansed. Kwon catheter was draining clear urine.     The patient tolerated the procedure well. Sponge,instrument,needle counts were correct times 2.       I was present for the entire procedure.    Patient Disposition:  PACU  and hemodynamically stable        SIGNATURE: Elaina Amin MD  DATE: May 14, 2024  TIME: 9:59 AM

## 2024-05-14 NOTE — DISCHARGE SUMMARY
Discharge Summary - Eva Garduno 29 y.o. female MRN: [id number]    Unit/Bed#: -01 Encounter: [id number]    ADMISSION  Admission Date: 2024   Admitting Attending: Dr. Hurtado  Admitting Diagnoses:   Patient Active Problem List   Diagnosis    Focal nodular hyperplasia of liver    Allergic rhinitis    Hemorrhoids    Positive GBS test    34 weeks gestation     Decreased fetal movements in second trimester    Fetal growth restriction antepartum    Proteinuria    Pre-eclampsia during pregnancy in third trimester, antepartum    Headache in pregnancy, antepartum, third trimester    Preeclampsia       DELIVERY  Delivery Method: , Low Transverse    Delivery Date and Time: 2024  9:13 AM   Delivery Attending: Elaina Amin     DISCHARGE  Discharge Date: 24  Discharge Attending: Dr. Leona Wilson   Discharge Diagnosis:   Same, Delivered    Clinical course: Admission to Delivery  Eva Garduno is a 29 y.o.  who was admitted at 34w0d for concern for evolving pre-eclampsia in the setting of FGR. She met criteria for pre-eclampsia with severe features while admitted and was planned for IOL for pre-E w/ SF at 34w0d. Patient elected for 1 LTCS. She was counseled on the risks and benefits of the procedure.       Delayed postpartum hemorrhage requiring exam under anesthesia and D&C of uterus with Bakri balloon placement.  Postpartum hemorrhage necessitating Hemabate and misoprostol.  Bakri balloon was removed 5/15/24-patient has had minimal bleeding since removal       Delivery  Route of Delivery: , Low Transverse   Reason for  delivery: Elective/Maternal Request       Anesthesia: Spinal ,   QBL: 353 mL      Delivery: , Low Transverse  at 2024  9:13 AM     Baby's Weight: 1850 g (4 lb 1.3 oz) ; 65.26     Apgar scores: 8  and 8  at 1 and 5 minutes, respectively      Clinical Course: Post-Delivery:  The post delivery course was remarkable for :      Blood pressures notable  for severe range on 24 @ 0718 Procardia 30 mg XL was started.  Since then Procardia was 119-136/ 66-94.  Tolerating well denies side effects.  Plan to discharge on Procardia, 1 week follow-up in office and outpatient blood pressure monitoring program      Infant doing well in NICU anticipate discharge in about 1 week    On the day of discharge, the patient was ambulating, voiding spontaneously, tolerating oral intake, and hemodynamically stable. She was able to reasonably perform all ADLs. She had appropriate bowel function. Pain was well-controlled. She was discharged home on postpartum/postop day #4 without complications on procardia 30 mg daily and OP BP monitoring program. Patient was instructed to follow up with her OB  in 1 week as an outpatient  for BP check and was given appropriate warnings to call her provider with problems or concerns.    Pertinent lab findings included:   Blood type O positive.     Last three Hgb values:  Lab Results   Component Value Date    HGB 12.2 2024    HGB 12.4 2024    HGB 12.7 2024        Problem-specific follow-up plans included the following:  Problem List       Focal nodular hyperplasia of liver    Allergic rhinitis    Hemorrhoids    Positive GBS test    Current Assessment & Plan     Needs PCN in labor         * (Principal) Status post primary low transverse  section    Current Assessment & Plan     Admit to OB/GYN service  Admission labs wnl  EFW: 9%, 3.5 lbs  VTX by manual exam  GBS pos status: will need PCN for prophylaxis when in labor  She is aware that growth restricted babies will sometimes not tolerate labor and may need a  delivery  Steroid complete  NICU consult             Decreased fetal movements in second trimester    Fetal growth restriction antepartum    Current Assessment & Plan       MEASUREMENTS (* Included In Average GA)     AC             26.51 cm        30 weeks 4 days* (13%)  BPD             8.03 cm        32 weeks  1 day * (49%)  HC             28.53 cm        31 weeks 2 days* (6%)  Femur           5.74 cm        30 weeks 1 day * (4%)     HC/AC           1.08 [0.96 - 1.17]                 (60%)  FL/AC             22 [20 - 24]  FL/BPD            71 [71 - 87]  EFW Hadlock 4   1612 grams - 3 lbs 9 oz                 (9%)    Normal dopplers         Proteinuria    Overview     32w0d -  Protein Creatinine ratio 0.31  No elevated blood pressures in this pregnancy  : CBC, CMP wnl         Pre-eclampsia during pregnancy in third trimester, antepartum    Current Assessment & Plan     Pt met criteria for Pre-E w/ SF, started Magnesium for seizure prophylaxis  Headache requiring treatment currently after receiving tylenol, adding reglan/benadryl    Systolic (12hrs), Av , Min:129 , Max:147   Diastolic (12hrs), Av, Min:79, Max:93      UPC from 0.68 to 3.34   Recommend delivery for evolving severe features, especially in the setting of FGR fetus  Plan for elective 1LTCS this morning as requested by patient, Ancef ordered         Headache in pregnancy, antepartum, third trimester    Preeclampsia        Discharge med list:  Contraception: unsure      Medication List      ASK your doctor about these medications     acetaminophen 325 mg tablet; Commonly known as: TYLENOL; Take 2 tablets   (650 mg total) by mouth every 6 (six) hours as needed for mild pain,   headaches or fever   BENADRYL ALLERGY PO   calcium carbonate 500 mg chewable tablet; Commonly known as: TUMS; Chew   1 tablet (500 mg total) 2 (two) times a day as needed for indigestion or   heartburn   cetirizine 5 MG tablet; Commonly known as: ZyrTEC   EPINEPHrine 0.3 mg/0.3 mL Soaj; Commonly known as: EPIPEN; Inject 0.3 mL   (0.3 mg total) into a muscle once for 1 dose   famotidine 20 mg tablet; Commonly known as: PEPCID; Take 1 tablet (20 mg   total) by mouth 2 (two) times a day   metoclopramide 10 mg tablet; Commonly known as: REGLAN; Take 1 tablet   (10 mg total) by mouth  3 (three) times a day before meals   PRENATAL VITAMIN PO       Condition at discharge:   good     Disposition:   See After Visit Summary for discharge disposition information.    Planned Readmission:   No

## 2024-05-14 NOTE — ANESTHESIA PROCEDURE NOTES
Spinal Block    Patient location during procedure: OR  Start time: 5/14/2024 8:53 AM  Reason for block: procedure for pain and at surgeon's request  Staffing  Performed by: Demarcus Shelton DO  Authorized by: Demarcus Shelton DO    Preanesthetic Checklist  Completed: patient identified, IV checked, site marked, risks and benefits discussed, surgical consent, monitors and equipment checked, pre-op evaluation and timeout performed  Spinal Block  Patient position: sitting  Prep: ChloraPrep and site prepped and draped  Patient monitoring: frequent blood pressure checks, continuous pulse ox and heart rate  Approach: midline  Location: L3-4  Needle  Needle type: Pencan   Needle gauge: 24 G  Needle length: 4 in  Assessment  Sensory level: T4  Injection Assessment:  negative aspiration for heme, no paresthesia on injection and positive aspiration for clear CSF.  Post-procedure:  site cleaned

## 2024-05-14 NOTE — PROGRESS NOTES
OBGYN Progress Note - Antepartum  Eva Garduno 29 y.o. female MRN: [id number]  Unit/Bed#:  311-01 Encounter: [id number]    Assessment/Plan:  29 y.o.  at 34w0d admitted with pre-eclampsia with severe features. Hospital day 3. By problem:    Pre-eclampsia during pregnancy in third trimester, antepartum  Assessment & Plan  Pt met criteria for Pre-E w/ SF, started Magnesium for seizure prophylaxis  Headache requiring treatment currently after receiving tylenol, adding reglan/benadryl    Systolic (12hrs), Av , Min:129 , Max:147   Diastolic (12hrs), Av, Min:79, Max:93      UPC from 0.68 to 3.34   Recommend delivery for evolving severe features, especially in the setting of FGR fetus  Plan for elective 1LTCS this morning as requested by patient, Ancef ordered    Fetal growth restriction antepartum  Assessment & Plan    MEASUREMENTS (* Included In Average GA)     AC             26.51 cm        30 weeks 4 days* (13%)  BPD             8.03 cm        32 weeks 1 day * (49%)  HC             28.53 cm        31 weeks 2 days* (6%)  Femur           5.74 cm        30 weeks 1 day * (4%)     HC/AC           1.08 [0.96 - 1.17]                 (60%)  FL/AC             22 [20 - 24]  FL/BPD            71 [71 - 87]  EFW Hadlock 4   1612 grams - 3 lbs 9 oz                 (9%)    Normal dopplers    * 34 weeks gestation of pregnancy  Assessment & Plan  Admit to OB/GYN service  Admission labs wnl  EFW: 9%, 3.5 lbs  VTX by manual exam  Steroid complete  NICU consult        Subjective:   Patient seen at bedside owing to complaint of 8 out of 10 headache.  The patient states that she has not slept more than an hour.  She also feels that sometimes she cannot see straight.  We discussed that this may due to exhaustion as well as effects of the magnesium.  Patient denies chest pain, shortness of breath, right upper quadrant pain, leg pain.  After the patient has her lab work drawn we discussed the plan for IV Reglan and Benadryl  which may help her take a nap and may help with her headache, which initially resolved after Tylenol.  The patient understands the plan for  at 8 AM this morning.    Objective:   Vitals:   Temp:  [97.7 °F (36.5 °C)-98.1 °F (36.7 °C)] 98 °F (36.7 °C)  HR:  [60-89] 85  Resp:  [16-18] 18  BP: (128-187)/() 138/79     Physical Exam  GEN: The patient was alert and oriented x3, pleasant well-appearing female in no acute distress.   CV: Regular rate  PULM: nonlabored respirations  MSK: Normal gait  Skin: warm, dry  Neuro: no focal deficits  Psych: normal affect and judgement, cooperative      Fetal Assessment:  FHT: 120 bpm/moderate variability/15x15 accelerations/absent decelerations; reactive  Peterson: no contractions       Lab Results   Component Value Date    WBC 20.32 (H) 2024    HGB 12.4 2024    HCT 39.5 2024    MCV 98 2024     2024       Lab Results   Component Value Date    CALCIUM 8.1 (L) 2024    K 4.9 2024    CO2 18 (L) 2024     2024    BUN 13 2024    CREATININE 0.63 2024       Lab Results   Component Value Date    ALT 13 2024    AST 27 2024    ALKPHOS 86 2024       Candice Ornelas MD  PGY-II, OB/GYN  2024, 4:18 AM

## 2024-05-14 NOTE — CONSULTS
Inpatient consult to Neonatology  Consult performed by: Danny Rbeolledo  Consult ordered by: Mariella Lopez MD      Reason for Consult: Pre-eclampsia, Prematurity    Inpatient prenatal consultation conducted today.      HPI: Eva is a 29 y.o. year old  female at 33 w6d , She was admitted to L&D for Pre-eclampsia, fetal growth restriction    Prenatal labs include Type O positive, Antibody negative, RPR (NR), HepB (neg), Rubella Immune, HIV (neg), GBS (unknown).   Mother is not in active labor, has completed Betamethasone.   She is being managed expectantly by OBG, possible C section on 5 AM, unless MFM advises otherwise, or clinical condition changes.      Discussed that neonatologist is available in-house at [facility]  and would participate in the management of her infant. Since infant is expected to deliver prematurely, a neonatologist would be at the delivery, and infant would need to be admitted to NICU.   Offered option of DBM, discussed need for TPN, need for umbilical lines if needed  Discussed delivery room and  management of  infants, including assessment and typical management of cardiorespiratory, infectious, nutritional, and thermal needs. Reassured mother that severe complications are unusual at current or anticipated EGA at delivery. Mentioned that length of stay varies, but that infants are often ready for discharge by about one month prior to the due date, and that family will have ample warning as discharge readiness approaches.      Parents questions answered.      Counseling / Coordination of Care  I spent 20 minutes in discussion with parents/nurses/Obg and 10 minutes charting. Greater than 50% of total time was spent with the patient and / or family counseling and / or coordination of care.   Danny Rebolledo MD  Neonatology

## 2024-05-14 NOTE — H&P
History and Physical - Obstetrics  Eva Garduno 29 y.o. female MRN: [id number]  Unit/Bed#: -01 Encounter: [id number]    ObGyn Provider:  [facility]      Assessment:  34 weeks pregnant  Pre-eclampsia with severe features    Plan:  Primary low transverse  by maternal request  Delivery indicated due to pre-eclampsia with severe features              SUBJECTIVE:  Chief Complaint: Pre-eclampsia    History of Present Illness:  Eva Garduno        Review of Systems   Constitutional: Negative.    HENT: Negative.     Eyes: Negative.    Respiratory: Negative.     Cardiovascular: Negative.    Gastrointestinal: Negative.    Endocrine: Negative.    Genitourinary:         As noted in HPI   Musculoskeletal: Negative.    Skin: Negative.    Allergic/Immunologic: Negative.    Neurological: Negative.    Hematological: Negative.    Psychiatric/Behavioral: Negative.         Current Pregnancy Problems:  No problems updated.    Past Obstetric History:   Patient's last menstrual period was 2023.   OB History    Para Term  AB Living   1 0 0 0 0 0   SAB IAB Ectopic Multiple Live Births   0 0 0 0 0      # Outcome Date GA Lbr Chucho/2nd Weight Sex Delivery Anes PTL Lv   1 Current                Pregnancy Plan:  Pregnancy: Acosta  Fetal sex: Female  Support person: Floyd     Delivery Plans  Planned delivery method: Vaginal  Planned delivery location: [facility] L&D  Acceptable blood products: All     Post-Delivery Plans  Feeding intentions: Breast Milk        Historical Information     Past Medical History:   Diagnosis Date    Eczema     Liver cyst      Past Surgical History:   Procedure Laterality Date    FRENULECTOMY, LINGUAL      NH TONSILLECTOMY & ADENOIDECTOMY AGE 10/> N/A 10/3/2018    Procedure: TONSILLECTOMY;  Surgeon: Bradley Leon MD;  Location: [facility];  Service: ENT    WISDOM TOOTH EXTRACTION       Social History   Social History     Substance and Sexual Activity   Alcohol Use Not  Currently    Comment: socially     Social History     Substance and Sexual Activity   Drug Use No     Social History     Tobacco Use   Smoking Status Never   Smokeless Tobacco Never     E-Cigarette/Vaping    E-Cigarette Use Never User      E-Cigarette/Vaping Substances    Nicotine No     THC No     CBD No     Flavoring No     Other No     Unknown No      Family History: non-contributory      Allergies:   No Known Allergies    Current Medications:  Current Outpatient Medications   Medication Instructions    acetaminophen (TYLENOL) 650 mg, Oral, Every 6 hours PRN    calcium carbonate (TUMS) 500 mg, Oral, 2 times daily PRN    cetirizine (ZYRTEC) 5 mg, Oral, Daily    diphenhydrAMINE HCl (BENADRYL ALLERGY PO) Oral, As needed    EPINEPHrine (EPIPEN) 0.3 mg, Intramuscular, Once    famotidine (PEPCID) 20 mg, Oral, 2 times daily    metoclopramide (REGLAN) 10 mg, Oral, 3 times daily before meals    Prenatal Vit-Fe Fumarate-FA (PRENATAL VITAMIN PO) Oral       OBJECTIVE:  Vitals:  Patient Vitals for the past 24 hrs:   BP Temp Temp src Pulse Resp SpO2   05/14/24 0700 138/91 -- -- 91 -- --   05/14/24 0601 137/90 -- -- 81 -- --   05/14/24 0518 138/95 -- -- 80 -- --   05/14/24 0400 131/89 -- -- 78 -- --   05/14/24 0300 133/81 -- -- 78 -- --   05/14/24 0200 138/79 -- -- 85 -- --   05/14/24 0100 140/86 -- -- 89 -- --   05/14/24 0025 132/87 -- -- 77 -- --   05/13/24 2301 132/83 -- -- 73 -- --   05/13/24 2205 136/89 -- -- 76 -- --   05/13/24 2058 147/92 -- -- 75 -- --   05/13/24 2027 132/85 98 °F (36.7 °C) Oral 76 18 --   05/13/24 1827 140/90 -- -- 68 -- --   05/13/24 1800 -- -- -- -- -- 99 %   05/13/24 1741 137/89 -- -- 76 -- --   05/13/24 1726 144/93 -- -- 77 -- --   05/13/24 1711 136/86 -- -- 75 -- --   05/13/24 1700 146/92 -- -- -- -- --   05/13/24 1656 -- -- -- 72 -- --   05/13/24 1645 145/91 -- -- -- -- --   05/13/24 1640 -- -- -- 75 -- --   05/13/24 1630 141/89 -- -- 77 -- --   05/13/24 1621 129/82 -- -- 77 -- --   05/13/24 1615  140/85 -- -- -- -- --   05/13/24 1610 -- -- -- 81 -- --   05/13/24 1601 134/81 -- -- 81 -- --   05/13/24 1600 134/81 -- -- 81 -- --   05/13/24 1551 142/97 -- -- 81 -- --   05/13/24 1545 -- -- -- -- -- 100 %   05/13/24 1537 (!) 171/104 -- -- 67 -- --   05/13/24 1522 (!) 182/93 -- -- 60 -- --   05/13/24 1506 (!) 187/87 97.7 °F (36.5 °C) Oral 67 -- --   05/13/24 1231 129/71 98.1 °F (36.7 °C) Oral 69 17 --   05/13/24 0821 139/84 98 °F (36.7 °C) Oral 66 16 --     Body mass index is 31.41 kg/m².    Invasive Devices       Peripheral Intravenous Line  Duration             Peripheral IV 12/01/18 1991 days    Peripheral IV 05/13/24 Right;Ventral (anterior) Forearm 1 day    Peripheral IV 05/14/24 Left Antecubital <1 day                    Physical Exam  Constitutional:       General: She is not in acute distress.     Appearance: She is well-developed.   HENT:      Head: Normocephalic and atraumatic.      Mouth/Throat:      Mouth: Mucous membranes are moist.   Eyes:      Conjunctiva/sclera: Conjunctivae normal.   Cardiovascular:      Rate and Rhythm: Normal rate and regular rhythm.      Pulses: Normal pulses.      Heart sounds: Normal heart sounds.   Pulmonary:      Effort: Pulmonary effort is normal. No respiratory distress.      Breath sounds: Normal breath sounds.   Abdominal:      General: There is no distension.      Palpations: Abdomen is soft.      Tenderness: There is no abdominal tenderness.   Genitourinary:     General: Normal vulva.      Vagina: No vaginal discharge.   Musculoskeletal:      Cervical back: Normal range of motion.   Skin:     Coloration: Skin is not pale.      Findings: No erythema or rash.   Neurological:      Mental Status: She is alert and oriented to person, place, and time.   Psychiatric:         Mood and Affect: Mood normal.         Behavior: Behavior normal.         Thought Content: Thought content normal.         Judgment: Judgment normal.         Cervical Exam:    Cervical Dilation:  "1  Cervical Effacement: 50  Cervical Consistency: Soft  Fetal Station: -2  Presentation: Vertex  Method: Manual  OB Examiner: Chon    Estimated fetal weight: 1612 b  Presentation: vertex, confirmed by US on 5/8/24    Membranes: intact  Placenta: posterior    Fetal Heart Tracing:  FHT:   Baseline Rate (FHR): 135 bpm  Variability: Moderate  Accelerations: 15 x 15 or greater  Decelerations: None    [place]:  Contraction Frequency (minutes): 0  Contraction Duration (seconds): 0        Prenatal Labs: Blood Type:   Lab Results   Component Value Date/Time    ABO Grouping O 05/12/2024 11:17 PM     , D (Rh type):   Lab Results   Component Value Date/Time    Rh Factor Positive 05/12/2024 11:17 PM     , Antibody Screen: No results found for: \"ANTIBODYSCR\" , HCT/HGB:   Lab Results   Component Value Date/Time    Hematocrit 36.6 05/14/2024 04:36 AM    Hemoglobin 12.2 05/14/2024 04:36 AM      , MCV:   Lab Results   Component Value Date/Time    MCV 94 05/14/2024 04:36 AM      , Platelets:   Lab Results   Component Value Date/Time    Platelets 260 05/14/2024 04:36 AM      , 1 hour Glucola:   Lab Results   Component Value Date/Time    Glucose 90 03/16/2024 08:36 AM   , 3 hour GTT: No results found for: \"VOUZTOU8HO\", Varicella: No results found for: \"VARICELLAIGG\"    , Rubella:   Lab Results   Component Value Date/Time    Rubella IgG Quant 45.8 12/12/2023 09:47 AM        , VDRL/RPR: No results found for: \"RPR\"   , Urine Culture/Screen:   Lab Results   Component Value Date/Time    Urine Culture No Growth <1000 cfu/mL 05/13/2024 01:50 AM       , Urine Drug Screen: No results found for: \"AMPHETUR\", \"BARBTUR\", \"BDZUR\", \"THCUR\", \"COCAINEUR\", \"METHADONEUR\", \"OPIATEUR\", \"PCPUR\", \"MTHAMUR\", \"ECSTASYUR\", \"TRICYCLICSUR\", Hep B:   Lab Results   Component Value Date/Time    Hepatitis B Surface Ag Non-reactive 12/12/2023 09:47 AM     , Hep C: No components found for: \"HEPCSAG\", \"EXTHEPCSAG\"   , HIV: No results found for: \"HIVAGAB\"  , Chlamydia: No " "results found for: \"EXTCHLAMYDIA\"  , Gonorrhea:   Lab Results   Component Value Date/Time    N gonorrhoeae, DNA Probe Negative 12/14/2023 09:05 AM     , Group B Strep:  No results found for: \"STREPGRPB\"       Elaina Amin MD    5/14/2024  8:19 AM      "

## 2024-05-14 NOTE — ANESTHESIA POSTPROCEDURE EVALUATION
"Post-Op Assessment Note    CV Status:  Stable  Pain Score: 1    Pain management: adequate       Mental Status:  Alert and awake   Hydration Status:  Euvolemic   PONV Controlled:  Controlled   Airway Patency:  Patent     Post Op Vitals Reviewed: Yes    No anethesia notable event occurred.    Staff: Anesthesiologist               BP      Temp      Pulse     Resp      SpO2      /100   Pulse 76   Temp 97.7 °F (36.5 °C) (Oral)   Resp 20   Ht 5' 4\" (1.626 m)   Wt 83 kg (183 lb)   LMP 09/19/2023   SpO2 96%   Breastfeeding Yes   BMI 31.41 kg/m²     "

## 2024-05-14 NOTE — ASSESSMENT & PLAN NOTE
-Pt met criteria for Pre-E w/ SF, started Magnesium for seizure prophylaxis, discontinue at 24 hours from delivery at 9AM on 5/15/24  -Last acute treatment on 24  -F/u AM CBC, CMP  Systolic (12hrs), Av , Min:135 , Max:150   Diastolic (12hrs), Av, Min:66, Max:91  Procardia 30 Daily started 24  Enroll in OB BP monitoring

## 2024-05-15 PROBLEM — E83.51 HYPOCALCEMIA: Status: ACTIVE | Noted: 2024-05-15

## 2024-05-15 LAB
ALBUMIN SERPL BCP-MCNC: 2.7 G/DL (ref 3.5–5)
ALP SERPL-CCNC: 81 U/L (ref 34–104)
ALT SERPL W P-5'-P-CCNC: 7 U/L (ref 7–52)
ANION GAP SERPL CALCULATED.3IONS-SCNC: 3 MMOL/L (ref 4–13)
AST SERPL W P-5'-P-CCNC: 18 U/L (ref 13–39)
BILIRUB SERPL-MCNC: 0.29 MG/DL (ref 0.2–1)
BUN SERPL-MCNC: 10 MG/DL (ref 5–25)
CALCIUM ALBUM COR SERPL-MCNC: 7.2 MG/DL (ref 8.3–10.1)
CALCIUM SERPL-MCNC: 6.2 MG/DL (ref 8.4–10.2)
CHLORIDE SERPL-SCNC: 101 MMOL/L (ref 96–108)
CO2 SERPL-SCNC: 23 MMOL/L (ref 21–32)
CREAT SERPL-MCNC: 0.79 MG/DL (ref 0.6–1.3)
ERYTHROCYTE [DISTWIDTH] IN BLOOD BY AUTOMATED COUNT: 13.3 % (ref 11.6–15.1)
GFR SERPL CREATININE-BSD FRML MDRD: 100 ML/MIN/1.73SQ M
GLUCOSE SERPL-MCNC: 75 MG/DL (ref 65–140)
HCT VFR BLD AUTO: 27.5 % (ref 34.8–46.1)
HGB BLD-MCNC: 9.3 G/DL (ref 11.5–15.4)
MAGNESIUM SERPL-MCNC: 5.1 MG/DL (ref 1.9–2.7)
MCH RBC QN AUTO: 31.5 PG (ref 26.8–34.3)
MCHC RBC AUTO-ENTMCNC: 33.8 G/DL (ref 31.4–37.4)
MCV RBC AUTO: 93 FL (ref 82–98)
PLATELET # BLD AUTO: 225 THOUSANDS/UL (ref 149–390)
PMV BLD AUTO: 11.6 FL (ref 8.9–12.7)
POTASSIUM SERPL-SCNC: 5.1 MMOL/L (ref 3.5–5.3)
PROT SERPL-MCNC: 4.7 G/DL (ref 6.4–8.4)
RBC # BLD AUTO: 2.95 MILLION/UL (ref 3.81–5.12)
SODIUM SERPL-SCNC: 127 MMOL/L (ref 135–147)
WBC # BLD AUTO: 17.27 THOUSAND/UL (ref 4.31–10.16)

## 2024-05-15 PROCEDURE — 80053 COMPREHEN METABOLIC PANEL: CPT

## 2024-05-15 PROCEDURE — 99024 POSTOP FOLLOW-UP VISIT: CPT | Performed by: OBSTETRICS & GYNECOLOGY

## 2024-05-15 PROCEDURE — 85027 COMPLETE CBC AUTOMATED: CPT

## 2024-05-15 PROCEDURE — 83735 ASSAY OF MAGNESIUM: CPT

## 2024-05-15 RX ORDER — IBUPROFEN 600 MG/1
600 TABLET ORAL EVERY 6 HOURS SCHEDULED
Status: DISCONTINUED | OUTPATIENT
Start: 2024-05-15 | End: 2024-05-18 | Stop reason: HOSPADM

## 2024-05-15 RX ADMIN — OXYCODONE 10 MG: 5 TABLET ORAL at 20:59

## 2024-05-15 RX ADMIN — ENOXAPARIN SODIUM 40 MG: 40 INJECTION SUBCUTANEOUS at 13:48

## 2024-05-15 RX ADMIN — IBUPROFEN 600 MG: 600 TABLET, FILM COATED ORAL at 20:23

## 2024-05-15 RX ADMIN — MAGNESIUM SULFATE HEPTAHYDRATE 1 G/HR: 40 INJECTION, SOLUTION INTRAVENOUS at 07:13

## 2024-05-15 RX ADMIN — OXYCODONE 5 MG: 5 TABLET ORAL at 09:23

## 2024-05-15 RX ADMIN — OXYCODONE 5 MG: 5 TABLET ORAL at 15:42

## 2024-05-15 RX ADMIN — CEFAZOLIN SODIUM 2000 MG: 2 SOLUTION INTRAVENOUS at 05:49

## 2024-05-15 RX ADMIN — ACETAMINOPHEN 975 MG: 325 TABLET, FILM COATED ORAL at 13:48

## 2024-05-15 RX ADMIN — KETOROLAC TROMETHAMINE 15 MG: 30 INJECTION, SOLUTION INTRAMUSCULAR; INTRAVENOUS at 05:43

## 2024-05-15 NOTE — PROGRESS NOTES
Obieri balloon deflated by 50cc by Dr. Amin. Patient tolerate well, minimal bleeding.    Candice DAWSON PGY2

## 2024-05-15 NOTE — ASSESSMENT & PLAN NOTE
QBL 1400, required suction D&C for retained products and Bakri placement  -Bakri has been serially deflated since its placement, with 50cc remaining to be removed at 8AM  -On Ancef for 24 hours s/p D&C  -s/p bakri. Lochia wnl  - Hgb 12.2 > 9.3 s/p bakri with stable 5/16/24  9.2

## 2024-05-15 NOTE — PROGRESS NOTES
Progress Note - OB/GYN  Eva Garduno 29 y.o. female MRN: [id number]  Unit/Bed#:  303-01 Encounter: [id number]    Assessment and Plan     Eva Garduno is a patient of: [facility]. She is PPD# 1 s/p  primary  section, low transverse incision  with postpartum hemorrhage required D&C. Recovering well and is stable       * Status post primary low transverse  section  Assessment & Plan  QBL 1400, Hgb 12.2 --> post op Hgb 10, venofer ordered  Lines: shah in place with adequate output  Pain: Tylenol and toradol scheduled, mami 5/10 PRN    FEN: Tolerating regular diet  DVT ppx: SCDs and Lovenox 40mg qD  Passing flatus   Incision C/D/I       Hypocalcemia  Assessment & Plan  -Corrected calcium 6.8, s/p 1 g calcium gluconate, follow-up AM CMP    Postpartum hemorrhage  Assessment & Plan  QBL 1400, required suction D&C for retained products and Bakri placement  -Bakri has been serially deflated since its placement, with 50cc remaining to be removed at 8AM  -On Ancef for 24 hours s/p D&C  -No bleeding around Bakri  -F/u AM CBC, last was 10, venofer ordered    Pre-eclampsia during pregnancy in third trimester, antepartum  Assessment & Plan  -Pt met criteria for Pre-E w/ SF, started Magnesium for seizure prophylaxis, discontinue at 24 hours from delivery at 9AM on 5/15/24  -Last acute treatment on 24  -F/u AM CBC, CMP  Systolic (12hrs), Av , Min:109 , Max:127   Diastolic (12hrs), Av, Min:74, Max:94          Disposition    - Anticipate discharge home on PPD# 2-4      Subjective/Objective     Chief Complaint: Postpartum State     Subjective:    Eva Garduno is PPD/POD#1 s/p  primary  section, low transverse incision. She has no current complaints.  Pain is well controlled.  Patient is not currently voiding spontaneously.  She is not ambulating.  Patient is not currently passing flatus and has had no bowel movement. She is tolerating PO, and denies nausea or vomitting. Patient  "denies fever, chills, chest pain, shortness of breath, or calf tenderness. Lochia is absent with Bakri in place. She is  Breastfeeding. She is recovering well and is stable.       Vitals:   /83 (BP Location: Right arm)   Pulse 71   Temp 97.6 °F (36.4 °C) (Oral)   Resp 18   Ht 5' 4\" (1.626 m)   Wt 83 kg (183 lb)   LMP 09/19/2023   SpO2 96%   Breastfeeding Yes   BMI 31.41 kg/m²       Intake/Output Summary (Last 24 hours) at 5/15/2024 0418  Last data filed at 5/15/2024 0301  Gross per 24 hour   Intake 1020 ml   Output 3850 ml   Net -2830 ml       Invasive Devices       Peripheral Intravenous Line  Duration             Peripheral IV 12/01/18 1992 days    Peripheral IV 05/13/24 Right;Ventral (anterior) Forearm 2 days    Peripheral IV 05/14/24 Left Antecubital 1 day    Peripheral IV 05/14/24 Left Hand <1 day              Drain  Duration             Intrauterine postpartum balloon 05/14/24 1 day    Urethral Catheter Double-lumen 16 Fr. <1 day                    Physical Exam:   GEN: Eva Garduno appears well, alert and oriented x 3, pleasant and cooperative   CARDIO: RRR, no murmurs or rubs  RESP:  CTAB, no wheezes or rales  ABDOMEN: soft, no tenderness, no distention, fundus @ U-2, Incision C/D/I  EXTREMITIES: SCDs on, non tender, no erythema      Labs:     Hemoglobin   Date Value Ref Range Status   05/14/2024 10.0 (L) 11.5 - 15.4 g/dL Final   05/14/2024 10.7 (L) 11.5 - 15.4 g/dL Final     WBC   Date Value Ref Range Status   05/14/2024 23.42 (H) 4.31 - 10.16 Thousand/uL Final   05/14/2024 26.86 (H) 4.31 - 10.16 Thousand/uL Final     Platelets   Date Value Ref Range Status   05/14/2024 236 149 - 390 Thousands/uL Final   05/14/2024 272 149 - 390 Thousands/uL Final     Creatinine   Date Value Ref Range Status   05/14/2024 0.60 0.60 - 1.30 mg/dL Final     Comment:     Standardized to IDMS reference method   05/14/2024 0.61 0.60 - 1.30 mg/dL Final     Comment:     Standardized to IDMS reference method     AST "   Date Value Ref Range Status   05/14/2024 17 13 - 39 U/L Final   05/14/2024 14 13 - 39 U/L Final     ALT   Date Value Ref Range Status   05/14/2024 8 7 - 52 U/L Final     Comment:     Specimen collection should occur prior to Sulfasalazine administration due to the potential for falsely depressed results.    05/14/2024 8 7 - 52 U/L Final     Comment:     Specimen collection should occur prior to Sulfasalazine administration due to the potential for falsely depressed results.           Candice Ornelas MD  5/15/2024  4:18 AM

## 2024-05-15 NOTE — PROGRESS NOTES
Called lab inquiring about calcium gluconate and magnesium and LR being run in the same line. Was told it is compatible to run together.

## 2024-05-16 LAB
ABO GROUP BLD BPU: NORMAL
ABO GROUP BLD BPU: NORMAL
BPU ID: NORMAL
BPU ID: NORMAL
CROSSMATCH: NORMAL
CROSSMATCH: NORMAL
ERYTHROCYTE [DISTWIDTH] IN BLOOD BY AUTOMATED COUNT: 13.6 % (ref 11.6–15.1)
HCT VFR BLD AUTO: 27.5 % (ref 34.8–46.1)
HGB BLD-MCNC: 9.2 G/DL (ref 11.5–15.4)
MCH RBC QN AUTO: 31.5 PG (ref 26.8–34.3)
MCHC RBC AUTO-ENTMCNC: 33.5 G/DL (ref 31.4–37.4)
MCV RBC AUTO: 94 FL (ref 82–98)
PLATELET # BLD AUTO: 236 THOUSANDS/UL (ref 149–390)
PMV BLD AUTO: 10.8 FL (ref 8.9–12.7)
RBC # BLD AUTO: 2.92 MILLION/UL (ref 3.81–5.12)
UNIT DISPENSE STATUS: NORMAL
UNIT DISPENSE STATUS: NORMAL
UNIT PRODUCT CODE: NORMAL
UNIT PRODUCT CODE: NORMAL
UNIT PRODUCT VOLUME: 350 ML
UNIT PRODUCT VOLUME: 350 ML
UNIT RH: NORMAL
UNIT RH: NORMAL
WBC # BLD AUTO: 13.98 THOUSAND/UL (ref 4.31–10.16)

## 2024-05-16 PROCEDURE — 85027 COMPLETE CBC AUTOMATED: CPT

## 2024-05-16 PROCEDURE — 99024 POSTOP FOLLOW-UP VISIT: CPT | Performed by: OBSTETRICS & GYNECOLOGY

## 2024-05-16 RX ADMIN — ACETAMINOPHEN 975 MG: 325 TABLET, FILM COATED ORAL at 08:07

## 2024-05-16 RX ADMIN — ENOXAPARIN SODIUM 40 MG: 40 INJECTION SUBCUTANEOUS at 08:07

## 2024-05-16 RX ADMIN — IBUPROFEN 600 MG: 600 TABLET, FILM COATED ORAL at 20:08

## 2024-05-16 RX ADMIN — IBUPROFEN 600 MG: 600 TABLET, FILM COATED ORAL at 14:09

## 2024-05-16 RX ADMIN — ACETAMINOPHEN 975 MG: 325 TABLET, FILM COATED ORAL at 00:06

## 2024-05-16 RX ADMIN — IBUPROFEN 600 MG: 600 TABLET, FILM COATED ORAL at 08:07

## 2024-05-16 RX ADMIN — OXYCODONE 5 MG: 5 TABLET ORAL at 13:04

## 2024-05-16 RX ADMIN — OXYCODONE 10 MG: 5 TABLET ORAL at 22:09

## 2024-05-16 RX ADMIN — ACETAMINOPHEN 975 MG: 325 TABLET, FILM COATED ORAL at 16:40

## 2024-05-16 RX ADMIN — IBUPROFEN 600 MG: 600 TABLET, FILM COATED ORAL at 02:40

## 2024-05-16 NOTE — PROGRESS NOTES
Progress Note - OB/GYN  Eva Garduno 29 y.o. female MRN: [id number]  Unit/Bed#: -01 Encounter: [id number]    Assessment and Plan     Eva Garduno is a patient of: [facility]. She is PPD# 2 s/p  primary  section, low transverse incision  with postpartum hemorrhage required D&C. Recovering well and is stable       Hypocalcemia  Assessment & Plan  -Corrected calcium 6.8, s/p 1 g calcium gluconate > 7.2    Postpartum hemorrhage  Assessment & Plan  QBL 1400, required suction D&C for retained products and Bakri placement  -Bakri has been serially deflated since its placement, with 50cc remaining to be removed at 8AM  -On Ancef for 24 hours s/p D&C  -s/p bakri. Lochia wnl  - Hgb 12.2 > 9.3 s/p bakri with repeat stable this AM 9.2    Pre-eclampsia during pregnancy in third trimester, antepartum  Assessment & Plan  -Pt met criteria for Pre-E w/ SF, started Magnesium for seizure prophylaxis, discontinue at 24 hours from delivery at 9AM on 5/15/24  -Last acute treatment on 24  -F/u AM CBC, CMP  Systolic (12hrs), Av , Min:99 , Max:135   Diastolic (12hrs), Av, Min:69, Max:89      * Status post primary low transverse  section  Assessment & Plan  QBL 1400, Hgb 12.2 --> post op Hgb 10, venofer ordered > 9.3 > 9.2  Lines: [x] VT  Pain: Tylenol and toradol scheduled, mami 5/10 PRN    FEN: Tolerating regular diet  DVT ppx: SCDs and Lovenox 40mg qD  Passing flatus   Incision C/D/I           Disposition    - Anticipate discharge home on PPD# 4      Subjective/Objective     Chief Complaint: Postpartum State     Subjective:    Eva Garduno is PPD/POD#2 s/p  primary  section, low transverse incision. She has no current complaints.  Pain is well controlled.  Patient is not currently voiding spontaneously.  She is not ambulating.  Patient is not currently passing flatus and has had no bowel movement. She is tolerating PO, and denies nausea or vomitting. Patient denies fever, chills,  "chest pain, shortness of breath, or calf tenderness. Lochia is absent with Bakri in place. She is  Breastfeeding. She is recovering well and is stable.       Vitals:   /88   Pulse 70   Temp 97.9 °F (36.6 °C)   Resp 18   Ht 5' 4\" (1.626 m)   Wt 83 kg (183 lb)   LMP 09/19/2023   SpO2 96%   Breastfeeding Yes   BMI 31.41 kg/m²       Intake/Output Summary (Last 24 hours) at 5/16/2024 0729  Last data filed at 5/16/2024 0700  Gross per 24 hour   Intake --   Output 3450 ml   Net -3450 ml       Invasive Devices       Peripheral Intravenous Line  Duration             Peripheral IV 12/1993 days    Peripheral IV 05/13/24 Right;Ventral (anterior) Forearm 3 days    Peripheral IV 05/14/24 Left Antecubital 2 days              Drain  Duration             Intrauterine postpartum balloon 05/14/24 2 days                    Physical Exam:   GEN: Eva Garduno appears well, alert and oriented x 3, pleasant and cooperative   CARDIO: RRR, no murmurs or rubs  RESP:  CTAB, no wheezes or rales  ABDOMEN: soft, no tenderness, no distention, fundus @ U-2, Incision C/D/I  EXTREMITIES: SCDs on, non tender, no erythema      Labs:     Hemoglobin   Date Value Ref Range Status   05/16/2024 9.2 (L) 11.5 - 15.4 g/dL Final   05/15/2024 9.3 (L) 11.5 - 15.4 g/dL Final     WBC   Date Value Ref Range Status   05/16/2024 13.98 (H) 4.31 - 10.16 Thousand/uL Final   05/15/2024 17.27 (H) 4.31 - 10.16 Thousand/uL Final     Platelets   Date Value Ref Range Status   05/16/2024 236 149 - 390 Thousands/uL Final   05/15/2024 225 149 - 390 Thousands/uL Final     Creatinine   Date Value Ref Range Status   05/15/2024 0.79 0.60 - 1.30 mg/dL Final     Comment:     Standardized to IDMS reference method   05/14/2024 0.60 0.60 - 1.30 mg/dL Final     Comment:     Standardized to IDMS reference method     AST   Date Value Ref Range Status   05/15/2024 18 13 - 39 U/L Final   05/14/2024 17 13 - 39 U/L Final     ALT   Date Value Ref Range Status   05/15/2024 7 7 " - 52 U/L Final     Comment:     Specimen collection should occur prior to Sulfasalazine administration due to the potential for falsely depressed results.    05/14/2024 8 7 - 52 U/L Final     Comment:     Specimen collection should occur prior to Sulfasalazine administration due to the potential for falsely depressed results.           Clare Devlin MD  5/16/2024  7:29 AM

## 2024-05-17 PROCEDURE — 88307 TISSUE EXAM BY PATHOLOGIST: CPT | Performed by: PATHOLOGY

## 2024-05-17 PROCEDURE — 99024 POSTOP FOLLOW-UP VISIT: CPT | Performed by: OBSTETRICS & GYNECOLOGY

## 2024-05-17 RX ORDER — SIMETHICONE 80 MG
80 TABLET,CHEWABLE ORAL EVERY 6 HOURS PRN
Status: DISCONTINUED | OUTPATIENT
Start: 2024-05-17 | End: 2024-05-18 | Stop reason: HOSPADM

## 2024-05-17 RX ORDER — NIFEDIPINE 30 MG/1
30 TABLET, EXTENDED RELEASE ORAL DAILY
Status: DISCONTINUED | OUTPATIENT
Start: 2024-05-17 | End: 2024-05-18 | Stop reason: HOSPADM

## 2024-05-17 RX ADMIN — ENOXAPARIN SODIUM 40 MG: 40 INJECTION SUBCUTANEOUS at 09:00

## 2024-05-17 RX ADMIN — NIFEDIPINE 30 MG: 30 TABLET, FILM COATED, EXTENDED RELEASE ORAL at 09:00

## 2024-05-17 RX ADMIN — IBUPROFEN 600 MG: 600 TABLET, FILM COATED ORAL at 02:34

## 2024-05-17 RX ADMIN — FAMOTIDINE 20 MG: 20 TABLET, FILM COATED ORAL at 08:59

## 2024-05-17 RX ADMIN — SIMETHICONE 80 MG: 80 TABLET, CHEWABLE ORAL at 00:59

## 2024-05-17 RX ADMIN — ACETAMINOPHEN 975 MG: 325 TABLET, FILM COATED ORAL at 19:01

## 2024-05-17 RX ADMIN — IBUPROFEN 600 MG: 600 TABLET, FILM COATED ORAL at 17:09

## 2024-05-17 RX ADMIN — OXYCODONE 10 MG: 5 TABLET ORAL at 22:24

## 2024-05-17 RX ADMIN — ACETAMINOPHEN 975 MG: 325 TABLET, FILM COATED ORAL at 00:40

## 2024-05-17 RX ADMIN — ACETAMINOPHEN 975 MG: 325 TABLET, FILM COATED ORAL at 10:47

## 2024-05-17 RX ADMIN — IBUPROFEN 600 MG: 600 TABLET, FILM COATED ORAL at 09:00

## 2024-05-17 NOTE — PROGRESS NOTES
Discharge teaching performed with patient. Save your life magnet given and reviewed.  Patient verbalized an understanding.

## 2024-05-17 NOTE — PROGRESS NOTES
Progress Note - OB/GYN  Eva Garduno 29 y.o. female MRN: [id number]  Unit/Bed#: -01 Encounter: [id number]    Assessment and Plan     Eva Garduno is a patient of: [facility]. She is PPD# 3 s/p  primary  section, low transverse incision  with postpartum hemorrhage required D&C. Recovering well and is stable       Hypocalcemia  Assessment & Plan  -Corrected calcium 6.8, s/p 1 g calcium gluconate > 7.2    Postpartum hemorrhage  Assessment & Plan  QBL 1400, required suction D&C for retained products and Bakri placement  -Bakri has been serially deflated since its placement, with 50cc remaining to be removed at 8AM  -On Ancef for 24 hours s/p D&C  -s/p bakri. Lochia wnl  - Hgb 12.2 > 9.3 s/p bakri with repeat stable this AM 9.2    Pre-eclampsia during pregnancy in third trimester, antepartum  Assessment & Plan  -Pt met criteria for Pre-E w/ SF, started Magnesium for seizure prophylaxis, discontinue at 24 hours from delivery at 9AM on 5/15/24  -Last acute treatment on 24  -F/u AM CBC, CMP  Systolic (12hrs), Av , Min:135 , Max:150   Diastolic (12hrs), Av, Min:66, Max:91  Start procardia 30    * Status post primary low transverse  section  Assessment & Plan  QBL 1400, Hgb 12.2 --> post op Hgb 10, venofer ordered > 9.3 > 9.2  Lines: [x] VT  Pain: Tylenol and toradol scheduled, mami 5/10 PRN    FEN: Tolerating regular diet  DVT ppx: SCDs and Lovenox 40mg qD  Passing flatus   Incision C/D/I           Disposition    - Anticipate discharge home on PPD# 4      Subjective/Objective     Chief Complaint: Postpartum State     Subjective:    Eva Garduno is PPD/POD#3 s/p  primary  section, low transverse incision. She has no current complaints.  Pain is well controlled.  Patient is not currently voiding spontaneously.  She is not ambulating.  Patient is not currently passing flatus and has had no bowel movement. She is tolerating PO, and denies nausea or vomitting. Patient  "denies fever, chills, chest pain, shortness of breath, or calf tenderness. Lochia is absent with Bakri in place. She is  Breastfeeding. She is recovering well and is stable.       Vitals:   /66   Pulse 100   Temp (!) 97.4 °F (36.3 °C) (Temporal)   Resp 16   Ht 5' 4\" (1.626 m)   Wt 83 kg (183 lb)   LMP 09/19/2023   SpO2 99%   Breastfeeding Yes   BMI 31.41 kg/m²     No intake or output data in the 24 hours ending 05/17/24 0734      Invasive Devices       Peripheral Intravenous Line  Duration             Peripheral IV 12/01/18 1994 days    Peripheral IV 05/13/24 Right;Ventral (anterior) Forearm 4 days    Peripheral IV 05/14/24 Left Antecubital 3 days              Drain  Duration             Intrauterine postpartum balloon 05/14/24 3 days                    Physical Exam:   GEN: Eva Garduno appears well, alert and oriented x 3, pleasant and cooperative   CARDIO: RRR, no murmurs or rubs  RESP:  CTAB, no wheezes or rales  ABDOMEN: soft, no tenderness, no distention, fundus @ U-2, Incision C/D/I  EXTREMITIES: non tender, no erythema      Labs:     Hemoglobin   Date Value Ref Range Status   05/16/2024 9.2 (L) 11.5 - 15.4 g/dL Final   05/15/2024 9.3 (L) 11.5 - 15.4 g/dL Final     WBC   Date Value Ref Range Status   05/16/2024 13.98 (H) 4.31 - 10.16 Thousand/uL Final   05/15/2024 17.27 (H) 4.31 - 10.16 Thousand/uL Final     Platelets   Date Value Ref Range Status   05/16/2024 236 149 - 390 Thousands/uL Final   05/15/2024 225 149 - 390 Thousands/uL Final     Creatinine   Date Value Ref Range Status   05/15/2024 0.79 0.60 - 1.30 mg/dL Final     Comment:     Standardized to IDMS reference method   05/14/2024 0.60 0.60 - 1.30 mg/dL Final     Comment:     Standardized to IDMS reference method     AST   Date Value Ref Range Status   05/15/2024 18 13 - 39 U/L Final   05/14/2024 17 13 - 39 U/L Final     ALT   Date Value Ref Range Status   05/15/2024 7 7 - 52 U/L Final     Comment:     Specimen collection should occur " prior to Sulfasalazine administration due to the potential for falsely depressed results.    05/14/2024 8 7 - 52 U/L Final     Comment:     Specimen collection should occur prior to Sulfasalazine administration due to the potential for falsely depressed results.           Clare Devlin MD  5/17/2024  7:34 AM

## 2024-05-17 NOTE — PLAN OF CARE
Problem: PAIN - ADULT  Goal: Verbalizes/displays adequate comfort level or baseline comfort level  Description: Interventions:  - Encourage patient to monitor pain and request assistance  - Assess pain using appropriate pain scale  - Administer analgesics based on type and severity of pain and evaluate response  - Implement non-pharmacological measures as appropriate and evaluate response  - Consider cultural and social influences on pain and pain management  - Notify physician/advanced practitioner if interventions unsuccessful or patient reports new pain  Outcome: Progressing     Problem: INFECTION - ADULT  Goal: Absence or prevention of progression during hospitalization  Description: INTERVENTIONS:  - Assess and monitor for signs and symptoms of infection  - Monitor lab/diagnostic results  - Monitor all insertion sites, i.e. indwelling lines, tubes, and drains  - Instruct and encourage patient and family to use good hand hygiene technique  - Identify and instruct in appropriate isolation precautions for identified infection/condition  Outcome: Progressing  Goal: Absence of fever/infection during neutropenic period  Description: INTERVENTIONS:  - Monitor WBC    Outcome: Progressing     Problem: SAFETY ADULT  Goal: Patient will remain free of falls  Description: INTERVENTIONS:  - Keep bed low and locked with side rails adjusted as appropriate  - Keep care items and personal belongings within reach  - Initiate and maintain comfort rounds  - Make Fall Risk Sign visible to staff    Outcome: Progressing  Goal: Maintain or return to baseline ADL function  Description: INTERVENTIONS:  - Provide patient education as appropriate  Outcome: Progressing  Goal: Maintains/Returns to pre admission functional level  Description: INTERVENTIONS:  - Record patient progress and toleration of activity level   Outcome: Progressing     Problem: Knowledge Deficit  Goal: Patient/family/caregiver demonstrates understanding of disease  process, treatment plan, medications, and discharge instructions  Description: Complete learning assessment and assess knowledge base.  Interventions:  - Provide teaching at level of understanding  - Provide teaching via preferred learning methods  Outcome: Progressing     Problem: DISCHARGE PLANNING  Goal: Discharge to home or other facility with appropriate resources  Description: INTERVENTIONS:  - Identify barriers to discharge w/patient and caregiver  - Arrange for needed discharge resources and transportation as appropriate  - Identify discharge learning needs (meds, wound care, etc.)  - Arrange for interpretive services to assist at discharge as needed  - Refer to Case Management Department for coordinating discharge planning if the patient needs post-hospital services based on physician/advanced practitioner order or complex needs related to functional status, cognitive ability, or social support system  Outcome: Progressing     Problem: ANTEPARTUM  Goal: Maintain pregnancy as long as maternal and/or fetal condition is stable  Description: INTERVENTIONS:  - Maternal surveillance  - Fetal surveillance  - Monitor uterine activity  - Medications as ordered  - Bedrest  Outcome: Completed

## 2024-05-17 NOTE — CASE MANAGEMENT
Case Management Progress Note    Patient name Eva Garduno  Location /-01 MRN [id number]  : 1995 Date 2024       LOS (days): 4  Geometric Mean LOS (GMLOS) (days):   Days to GMLOS:        OBJECTIVE:        Current admission status: Inpatient  Preferred Pharmacy:   [id number] [place] - [address]  [address] [facility] [address]  Phone: [phone] Fax: [phone]    [facility] [place] - [address]  [address] [address]  Phone: [phone] Fax: [phone]    [id number] [place] - [address]  [address] [address]  Phone: [phone] Fax: [phone]    [id number] [place] - [address]  [address] [address]  Phone: [phone] Fax: [phone]    Primary Care Provider: Obed Gage DO    Primary Insurance: BLUE CROSS  Secondary Insurance:     PROGRESS NOTE:    Consult(s):  NICU assessment     Delivery method/date: 24   Age: 34+3 Gestational age: 34+0  Admitted to NICU for respiratory distress    CM met w/MOB who provided the following information:      Baby's name/gender: Baby Girl Laureen  Mother of baby: Eva Garduno  Father of baby//SO: Floyd Garduno  Other children: N/A  Lives with: FOB  Baby Supplies: Confirmed having all necessary supplies  Bottle or Breast Feeding: Both  Breast Pump if breast feeding: MOB ordered Medela Freestyle through insurance; however, is not getting as sufficient supply. She is requesting Spectra S2 pump. Michaela wu [facility] [place] confirmed she is able to purchase pump for MOB. CM requested Spectra s2 be placed for [employer]  on [address]. MOB stated her mother, capri jacobsen, is able to  pump once ready.  Government Assistance Programs/WIC/EBT/SSI:  MOB denies   Work/School: Family employed (FOB works at [employer])  Transportation:  Both MOB and FOB drive  [facility] care: [facility]  Pediatrician:  MILAGRO  Mental Health Hx or Treatment: MOB denies  Substance Abuse: MOB denies   Insurance for baby: [facility] Resources/Dianna Mackenzie/FOZIAGD: NICU resource packet provided. Informed of Navigate the NICU sessions. Referral made to Dianna Mackenzie.        MOB denies any other CM needs at this time. Encouraged family to contact CM as needed.     CM will follow infant in NICU through discharge

## 2024-05-17 NOTE — CASE MANAGEMENT
Case Management Progress Note    Patient name Eva Garduno  Location /-01 MRN [id number]  : 1995 Date 2024       LOS (days): 4  Geometric Mean LOS (GMLOS) (days):   Days to GMLOS:        OBJECTIVE:        Current admission status: Inpatient  Preferred Pharmacy:   [id number] - [place] - [address]  [address] [facility] [address]  Phone: [phone] Fax: [phone]    [facility] [place] - [address]  [address] [place] [address]  Phone: [phone] Fax: [phone]    [facility][id number] - [place] - [address]  [address] [address]  Phone: [phone] Fax: [phone]    [id number] - [place] - [address]  [address] [place] [address]  Phone: [phone] Fax: [phone]    Primary Care Provider: Obed Gage DO    Primary Insurance: BLUE CROSS  Secondary Insurance:     PROGRESS NOTE:    Michaela wu [place] [place] confirmed breast pump from [employer] is ready for . CM informed MOB and her mother, Avelina. Avelina confirmed she will  pump today.

## 2024-05-18 VITALS
DIASTOLIC BLOOD PRESSURE: 79 MMHG | BODY MASS INDEX: 31.24 KG/M2 | OXYGEN SATURATION: 96 % | SYSTOLIC BLOOD PRESSURE: 119 MMHG | HEIGHT: 64 IN | RESPIRATION RATE: 18 BRPM | HEART RATE: 92 BPM | WEIGHT: 183 LBS | TEMPERATURE: 98.4 F

## 2024-05-18 PROCEDURE — 99024 POSTOP FOLLOW-UP VISIT: CPT | Performed by: NURSE PRACTITIONER

## 2024-05-18 RX ORDER — ACETAMINOPHEN 325 MG/1
975 TABLET ORAL EVERY 8 HOURS PRN
Start: 2024-05-18

## 2024-05-18 RX ORDER — NIFEDIPINE 30 MG/1
30 TABLET, EXTENDED RELEASE ORAL DAILY
Qty: 30 TABLET | Refills: 1 | Status: SHIPPED | OUTPATIENT
Start: 2024-05-18

## 2024-05-18 RX ORDER — IBUPROFEN 600 MG/1
600 TABLET ORAL EVERY 6 HOURS SCHEDULED
Start: 2024-05-18

## 2024-05-18 RX ORDER — OXYCODONE HYDROCHLORIDE 5 MG/1
5 TABLET ORAL EVERY 4 HOURS PRN
Qty: 4 TABLET | Refills: 0 | Status: SHIPPED | OUTPATIENT
Start: 2024-05-18 | End: 2024-05-28

## 2024-05-18 RX ADMIN — NIFEDIPINE 30 MG: 30 TABLET, FILM COATED, EXTENDED RELEASE ORAL at 09:05

## 2024-05-18 RX ADMIN — ACETAMINOPHEN 975 MG: 325 TABLET, FILM COATED ORAL at 05:47

## 2024-05-18 RX ADMIN — IBUPROFEN 600 MG: 600 TABLET, FILM COATED ORAL at 01:30

## 2024-05-18 RX ADMIN — ENOXAPARIN SODIUM 40 MG: 40 INJECTION SUBCUTANEOUS at 09:04

## 2024-05-18 RX ADMIN — FAMOTIDINE 20 MG: 20 TABLET, FILM COATED ORAL at 09:05

## 2024-05-18 RX ADMIN — IBUPROFEN 600 MG: 600 TABLET, FILM COATED ORAL at 08:00

## 2024-05-18 NOTE — PROGRESS NOTES
Progress Note - OB/GYN  Eva Garduno 29 y.o. female MRN: [id number]  Unit/Bed#: -01 Encounter: [id number]    Assessment and Plan     Eva Garduno is a patient of: [facility]. She is POD# 4 s/p  primary  section, low transverse incision  Recovering well and is stable .        Hypocalcemia  Assessment & Plan  -Corrected calcium 6.8, s/p 1 g calcium gluconate > 7.2    Postpartum hemorrhage  Assessment & Plan  QBL 1400, required suction D&C for retained products and Bakri placement  -Bakri has been serially deflated since its placement, with 50cc remaining to be removed at 8AM  -On Ancef for 24 hours s/p D&C  -s/p bakri. Lochia wnl  - Hgb 12.2 > 9.3 s/p bakri with stable 24  9.2    Pre-eclampsia during pregnancy in third trimester, antepartum  Assessment & Plan  -Pt met criteria for Pre-E w/ SF, started Magnesium for seizure prophylaxis, discontinue at 24 hours from delivery at 9AM on 5/15/24  -Last acute treatment on 24  -F/u AM CBC, CMP  Systolic (12hrs), Av , Min:135 , Max:150   Diastolic (12hrs), Av, Min:66, Max:91  Procardia 30 Daily started 24  Enroll in OB BP monitoring     * Status post primary low transverse  section  Assessment & Plan  QBL 1400, Hgb 12.2 --> post op Hgb 10, venofer ordered > 9.3 > 9.2  Lines: [x] VT  Pain: Tylenol and toradol scheduled, mami 5/10 PRN    FEN: Tolerating regular diet  DVT ppx: SCDs and Lovenox 40mg qD  Passing flatus   Incision C/D/I           Disposition    - Anticipate discharge home today              - enroll in OP BP monitoring               - pain management       Subjective/Objective     Chief Complaint: Postpartum State     Subjective:    Eva Garduno is PPD/POD#4 s/p  primary  section, low vertical incision. She has no current complaints.  Pain is well controlled. She is recovering well and is stable.     Breastfeeding:  Pumping - infant in NICU   Tolerating PO: yes  Nausea or Vomiting: no  Voiding: yes  Flatus: yes; has  "had bowel movement.   Ambulating: yes  Chest pain: no  Shortness of breath: no  Leg pain: no  Lochia: small    Vitals:   /92 (BP Location: Left arm)   Pulse 69   Temp 98.3 °F (36.8 °C) (Temporal)   Resp 18   Ht 5' 4\" (1.626 m)   Wt 83 kg (183 lb)   LMP 09/19/2023   SpO2 95%   Breastfeeding Yes   BMI 31.41 kg/m²     No intake or output data in the 24 hours ending 05/18/24 0849    Invasive Devices       Peripheral Intravenous Line  Duration             Peripheral IV 12/01/18 1995 days    Peripheral IV 05/13/24 Right;Ventral (anterior) Forearm 5 days    Peripheral IV 05/14/24 Left Antecubital 4 days              Drain  Duration             Intrauterine postpartum balloon 05/14/24 4 days                    Physical Exam:   GEN: Eva Garduno appears well, alert and oriented x 3, pleasant and cooperative   CARDIO: RRR, no murmurs or rubs  RESP:  CTAB, no wheezes or rales  ABDOMEN: soft, no tenderness, no distention, fundus @ U-1, Incision C/D/I  EXTREMITIES: SCDs on, non tender, trace edema, b/l Brina's sign negative      Labs:     Hemoglobin   Date Value Ref Range Status   05/16/2024 9.2 (L) 11.5 - 15.4 g/dL Final   05/15/2024 9.3 (L) 11.5 - 15.4 g/dL Final     WBC   Date Value Ref Range Status   05/16/2024 13.98 (H) 4.31 - 10.16 Thousand/uL Final   05/15/2024 17.27 (H) 4.31 - 10.16 Thousand/uL Final     Platelets   Date Value Ref Range Status   05/16/2024 236 149 - 390 Thousands/uL Final   05/15/2024 225 149 - 390 Thousands/uL Final     Creatinine   Date Value Ref Range Status   05/15/2024 0.79 0.60 - 1.30 mg/dL Final     Comment:     Standardized to IDMS reference method   05/14/2024 0.60 0.60 - 1.30 mg/dL Final     Comment:     Standardized to IDMS reference method     AST   Date Value Ref Range Status   05/15/2024 18 13 - 39 U/L Final   05/14/2024 17 13 - 39 U/L Final     ALT   Date Value Ref Range Status   05/15/2024 7 7 - 52 U/L Final     Comment:     Specimen collection should occur prior to " Sulfasalazine administration due to the potential for falsely depressed results.    05/14/2024 8 7 - 52 U/L Final     Comment:     Specimen collection should occur prior to Sulfasalazine administration due to the potential for falsely depressed results.           ANDERS Strickland  5/18/2024  8:49 AM

## 2024-05-18 NOTE — PROGRESS NOTES
Patient successfully enrolled in [facility] Home Blood Pressure Monitoring Program.  Patient verbalizes understanding.

## 2024-05-18 NOTE — PLAN OF CARE
Problem: PAIN - ADULT  Goal: Verbalizes/displays adequate comfort level or baseline comfort level  Description: Interventions:  - Encourage patient to monitor pain and request assistance  - Assess pain using appropriate pain scale  - Administer analgesics based on type and severity of pain and evaluate response  - Implement non-pharmacological measures as appropriate and evaluate response  - Consider cultural and social influences on pain and pain management  - Notify physician/advanced practitioner if interventions unsuccessful or patient reports new pain  5/18/2024 1111 by Jennifer Dangelo RN  Outcome: Adequate for Discharge  5/18/2024 0708 by Jennifer Dangelo RN  Outcome: Progressing     Problem: INFECTION - ADULT  Goal: Absence or prevention of progression during hospitalization  Description: INTERVENTIONS:  - Assess and monitor for signs and symptoms of infection  - Monitor lab/diagnostic results  - Monitor all insertion sites, i.e. indwelling lines, tubes, and drains  - Instruct and encourage patient and family to use good hand hygiene technique  - Identify and instruct in appropriate isolation precautions for identified infection/condition  5/18/2024 1111 by Jennifer Dangelo RN  Outcome: Adequate for Discharge  5/18/2024 0708 by Jennifer Dangelo RN  Outcome: Progressing  Goal: Absence of fever/infection during neutropenic period  Description: INTERVENTIONS:  - Monitor WBC    5/18/2024 1111 by Jennifer Dangelo RN  Outcome: Adequate for Discharge  5/18/2024 0708 by Jennifer Dangelo RN  Outcome: Progressing     Problem: SAFETY ADULT  Goal: Patient will remain free of falls  Description: INTERVENTIONS:  - Keep bed low and locked with side rails adjusted as appropriate  - Keep care items and personal belongings within reach  - Initiate and maintain comfort rounds  - Make Fall Risk Sign visible to staff    5/18/2024 1111 by Jennifer Dangelo RN  Outcome: Adequate for Discharge  5/18/2024 0708 by Jennifer Dangelo  RN  Outcome: Progressing  Goal: Maintain or return to baseline ADL function  Description: INTERVENTIONS:  - Provide patient education as appropriate  5/18/2024 1111 by Jennifer Dangelo RN  Outcome: Adequate for Discharge  5/18/2024 0708 by Jennifer Dangelo RN  Outcome: Progressing  Goal: Maintains/Returns to pre admission functional level  Description: INTERVENTIONS:  - Record patient progress and toleration of activity level   5/18/2024 1111 by Jennifer Dangelo RN  Outcome: Adequate for Discharge  5/18/2024 0708 by Jennifer Dangelo RN  Outcome: Progressing     Problem: Knowledge Deficit  Goal: Patient/family/caregiver demonstrates understanding of disease process, treatment plan, medications, and discharge instructions  Description: Complete learning assessment and assess knowledge base.  Interventions:  - Provide teaching at level of understanding  - Provide teaching via preferred learning methods  5/18/2024 1111 by Jennifer Dangelo RN  Outcome: Adequate for Discharge  5/18/2024 0708 by Jennifer Dangelo RN  Outcome: Progressing     Problem: DISCHARGE PLANNING  Goal: Discharge to home or other facility with appropriate resources  Description: INTERVENTIONS:  - Identify barriers to discharge w/patient and caregiver  - Arrange for needed discharge resources and transportation as appropriate  - Identify discharge learning needs (meds, wound care, etc.)  - Arrange for interpretive services to assist at discharge as needed  - Refer to Case Management Department for coordinating discharge planning if the patient needs post-hospital services based on physician/advanced practitioner order or complex needs related to functional status, cognitive ability, or social support system  5/18/2024 1111 by Jennifer Dangelo RN  Outcome: Adequate for Discharge  5/18/2024 0708 by Jennifer Dangelo RN  Outcome: Progressing

## 2024-05-18 NOTE — PLAN OF CARE
Problem: PAIN - ADULT  Goal: Verbalizes/displays adequate comfort level or baseline comfort level  Description: Interventions:  - Encourage patient to monitor pain and request assistance  - Assess pain using appropriate pain scale  - Administer analgesics based on type and severity of pain and evaluate response  - Implement non-pharmacological measures as appropriate and evaluate response  - Consider cultural and social influences on pain and pain management  - Notify physician/advanced practitioner if interventions unsuccessful or patient reports new pain  Outcome: Progressing     Problem: INFECTION - ADULT  Goal: Absence or prevention of progression during hospitalization  Description: INTERVENTIONS:  - Assess and monitor for signs and symptoms of infection  - Monitor lab/diagnostic results  - Monitor all insertion sites, i.e. indwelling lines, tubes, and drains  - Instruct and encourage patient and family to use good hand hygiene technique  - Identify and instruct in appropriate isolation precautions for identified infection/condition  Outcome: Progressing  Goal: Absence of fever/infection during neutropenic period  Description: INTERVENTIONS:  - Monitor WBC    Outcome: Progressing     Problem: SAFETY ADULT  Goal: Patient will remain free of falls  Description: INTERVENTIONS:  - Keep bed low and locked with side rails adjusted as appropriate  - Keep care items and personal belongings within reach  - Initiate and maintain comfort rounds  - Make Fall Risk Sign visible to staff    Outcome: Progressing  Goal: Maintain or return to baseline ADL function  Description: INTERVENTIONS:  - Provide patient education as appropriate  Outcome: Progressing  Goal: Maintains/Returns to pre admission functional level  Description: INTERVENTIONS:  - Record patient progress and toleration of activity level   Outcome: Progressing     Problem: Knowledge Deficit  Goal: Patient/family/caregiver demonstrates understanding of disease  process, treatment plan, medications, and discharge instructions  Description: Complete learning assessment and assess knowledge base.  Interventions:  - Provide teaching at level of understanding  - Provide teaching via preferred learning methods  Outcome: Progressing     Problem: DISCHARGE PLANNING  Goal: Discharge to home or other facility with appropriate resources  Description: INTERVENTIONS:  - Identify barriers to discharge w/patient and caregiver  - Arrange for needed discharge resources and transportation as appropriate  - Identify discharge learning needs (meds, wound care, etc.)  - Arrange for interpretive services to assist at discharge as needed  - Refer to Case Management Department for coordinating discharge planning if the patient needs post-hospital services based on physician/advanced practitioner order or complex needs related to functional status, cognitive ability, or social support system  Outcome: Progressing

## 2024-05-20 ENCOUNTER — TELEPHONE (OUTPATIENT)
Dept: OBGYN CLINIC | Facility: MEDICAL CENTER | Age: 29
End: 2024-05-20

## 2024-05-20 NOTE — TELEPHONE ENCOUNTER
Left message on vm. Please assist patient with scheduling BP check this week. 1LTCS 5/14.    ----- Message from ANDERS Andrew sent at 5/18/2024  8:56 AM EDT -----  Hi,     Pt needs an appt this week for PP BP check     Thank you !

## 2024-05-22 ENCOUNTER — CLINICAL SUPPORT (OUTPATIENT)
Dept: OBGYN CLINIC | Facility: MEDICAL CENTER | Age: 29
End: 2024-05-22

## 2024-05-22 DIAGNOSIS — Z01.30 BP CHECK: Primary | ICD-10-CM

## 2024-05-22 NOTE — PROGRESS NOTES
Pt presented for a blood pressure check   BP was 120/80  PT stated she felt fine and experiencing no symptoms

## 2024-05-28 ENCOUNTER — TELEPHONE (OUTPATIENT)
Age: 29
End: 2024-05-28

## 2024-05-28 ENCOUNTER — TELEPHONE (OUTPATIENT)
Dept: PERINATAL CARE | Facility: OTHER | Age: 29
End: 2024-05-28

## 2024-05-28 NOTE — TELEPHONE ENCOUNTER
Blood pressure monitoring program.  Marisela had a high alert blood pressure reading of 71/47 with a heart rate of 33 at 1047.  With a repeat 1 minute later of 106/87 and a heart rate of 101.  Left message to assess for palpitations, shortness of breath, lightheadedness, syncope.  Advised her if she is having any issues to reach out to her OB directly immediately or proceed to the nearest emergency room, but given very low reading followed immediately by a normal readings I suspect a cuff error.  Will attempt to reach out to her again.    F/U PC to patient. She denies symptoms. Alerts cleared.

## 2024-05-28 NOTE — TELEPHONE ENCOUNTER
Pt called to inquire about status of FMLA paperwork. Communicated FMLA paper work has been updated in her chart. Pt provided Fax# to submit [facility] paper work.    [phone] ATTN: Absence Team

## 2024-06-04 ENCOUNTER — POSTPARTUM VISIT (OUTPATIENT)
Dept: OBGYN CLINIC | Facility: MEDICAL CENTER | Age: 29
End: 2024-06-04
Payer: COMMERCIAL

## 2024-06-04 VITALS
BODY MASS INDEX: 27.76 KG/M2 | WEIGHT: 162.6 LBS | DIASTOLIC BLOOD PRESSURE: 70 MMHG | HEIGHT: 64 IN | SYSTOLIC BLOOD PRESSURE: 110 MMHG

## 2024-06-04 DIAGNOSIS — O14.93 PRE-ECLAMPSIA DURING PREGNANCY IN THIRD TRIMESTER, ANTEPARTUM: ICD-10-CM

## 2024-06-04 DIAGNOSIS — Z98.891 STATUS POST PRIMARY LOW TRANSVERSE CESAREAN SECTION: ICD-10-CM

## 2024-06-08 NOTE — PROGRESS NOTES
"OB POSTPARTUM VISIT PROGRESS NOTE  Date of Encounter: 2024    Eva Garduno    : 1995  (29 y.o.)  MR: [id number]    Subjective   Eva is in for her postpartum visit. She is now . She delivered by primary  section. She's generally doing well, denies current pain or bleeding issues, and has no significant depression issues. She is breast feeding with some supplementation. We discussed all appropriate contraceptive options and she is considering  IUD   Objective   EXAM:  GENERAL: alert, well appearing, and in no distress.  VITALS: Blood pressure 110/70, height 5' 4\" (1.626 m), weight 73.8 kg (162 lb 9.6 oz), last menstrual period 2023, currently breastfeeding.  BMI: Body mass index is 27.91 kg/m².    LUNGS:  unlabored   BREASTS: Deferred  ABDOMEN: incision clean close and intact   EXTREMITIES: All normal. No edema.  PELVIC:not performed       Assessment & Plan   Diagnoses and all orders for this visit:    Postpartum exam  We discussed birth control options   Considering IUD     Pre-eclampsia during pregnancy in third trimester, antepartum  - normotensive      Third-stage postpartum hemorrhage  - asymptomatic   Minimal to no bleeding   Status post primary low transverse  section  Recovering well  Incision clean close and intact        Yaz Calloway MD    "

## 2024-06-17 ENCOUNTER — NURSE TRIAGE (OUTPATIENT)
Dept: OTHER | Facility: OTHER | Age: 29
End: 2024-06-17

## 2024-06-17 ENCOUNTER — OFFICE VISIT (OUTPATIENT)
Dept: OBGYN CLINIC | Facility: MEDICAL CENTER | Age: 29
End: 2024-06-17
Payer: COMMERCIAL

## 2024-06-17 VITALS
BODY MASS INDEX: 27.59 KG/M2 | SYSTOLIC BLOOD PRESSURE: 110 MMHG | WEIGHT: 161.6 LBS | HEIGHT: 64 IN | DIASTOLIC BLOOD PRESSURE: 64 MMHG

## 2024-06-17 PROBLEM — O36.5990 FETAL GROWTH RESTRICTION ANTEPARTUM: Status: RESOLVED | Noted: 2024-04-30 | Resolved: 2024-06-17

## 2024-06-17 PROBLEM — R51.9 HEADACHE IN PREGNANCY, ANTEPARTUM, THIRD TRIMESTER: Status: RESOLVED | Noted: 2024-05-08 | Resolved: 2024-06-17

## 2024-06-17 PROBLEM — O14.90 PREECLAMPSIA: Status: RESOLVED | Noted: 2024-05-08 | Resolved: 2024-06-17

## 2024-06-17 PROBLEM — O26.893 HEADACHE IN PREGNANCY, ANTEPARTUM, THIRD TRIMESTER: Status: RESOLVED | Noted: 2024-05-08 | Resolved: 2024-06-17

## 2024-06-17 PROBLEM — O36.8120 DECREASED FETAL MOVEMENTS IN SECOND TRIMESTER: Status: RESOLVED | Noted: 2024-04-01 | Resolved: 2024-06-17

## 2024-06-17 PROBLEM — B95.1 POSITIVE GBS TEST: Status: RESOLVED | Noted: 2023-12-13 | Resolved: 2024-06-17

## 2024-06-17 PROBLEM — O14.93 PRE-ECLAMPSIA DURING PREGNANCY IN THIRD TRIMESTER, ANTEPARTUM: Status: RESOLVED | Noted: 2024-05-08 | Resolved: 2024-06-17

## 2024-06-17 PROBLEM — R80.9 PROTEINURIA: Status: RESOLVED | Noted: 2024-04-30 | Resolved: 2024-06-17

## 2024-06-17 PROCEDURE — 99213 OFFICE O/P EST LOW 20 MIN: CPT | Performed by: OBSTETRICS & GYNECOLOGY

## 2024-06-17 NOTE — TELEPHONE ENCOUNTER
"Reason for Disposition  • [1] Vaginal bleeding or spotting lasts > 5 weeks AND [2] pale-brown or pink    Answer Assessment - Initial Assessment Questions  1. ONSET: \"Describe your bleeding: is it getting worse, staying the same, improving, or stopping and starting?\"      Post partum bleeding stopped two weeks ago. Bleeding starting two days ago light at first but it has been heavy the last day. Think it may be a period but the bleeding is heavy.    2. AMOUNT: \"How much bleeding are you having today?\"      - SPOTTING: spotting, or pinkish / brownish mucous discharge; does not fill panti-liner or pad     - MILD:  less than 1 pad / hour; less than patient's usual menstrual bleeding    - MODERATE: 1-2 pads / hour; 1 menstrual cup every 6 hours; small-medium blood clots (e.g., pea, grape, small coin)    - SEVERE: soaking 2 or more pads/hour for 2 or more hours; 1 menstrual cup every 2 hours; bleeding not contained by pads or continuous red blood from vagina; large blood clots (e.g., golf ball, large coin)       Bleed through pad overnight last night and soiled the bed...bleeding onto pad every 2-3 hours while having a regular tampon in. 1-2 clots size up to quarter when changing tampon.     3. ABDOMINAL PAIN: \"Do you have any pain?\" \"How bad is the pain?\" \"What does it keep you from doing?\"      - MILD -  doesn't interfere with normal activities, abdomen soft and not tender to touch      - MODERATE - interferes with normal activities or awakens from sleep, tender to touch      - SEVERE - excruciating pain, doubled over, unable to do any normal activities        Moderate cramping that feels like period cramps    4. HORMONES: \"Are you taking any birth control medications?\" (e.g., birth control pills, Depo-Provera)      Denies     5. BLOOD THINNERS: \"Do you take any blood thinners?\" (e.g., coumadin, aspirin)      Neftapene-took dose today      6. OTHER SYMPTOMS: \"Do you have any other symptoms?\" (e.g., fever, chills, " "dizziness)      Denies     7. DELIVERY DATE: \"When was your delivery date?\" \"Vaginal delivery or ?\"       with post partum hemorrhage     8. BREASTFEEDING: \"Are you breastfeeding?\"      Breastfeeding     Tylenol taken last at 8 pm. Blood pressure 106/84 taken approximately 10 minutes ago. Denies fever. Denies exercise or other activity    Protocols used: Postpartum - Vaginal Bleeding and Lochia-ADULT-AH    "

## 2024-06-17 NOTE — TELEPHONE ENCOUNTER
"Regardin wks pp / bleeding  ----- Message from Jenny MEZA sent at 2024  1:36 AM EDT -----  \"I'm almost 5 weeks post partum and I'm still bleeding I thought it was my period but I'm bleeding through a tampon every 2-3 hours\"    "

## 2024-06-17 NOTE — TELEPHONE ENCOUNTER
Paged on call OB/GYN for recommendations. Provider stated that is okay to monitor bleeding at home and been seen in the office tomorrow due to history; however, if bleeding worsens or patient becomes dizzy or has other symptoms go to ER. Reviewed recommendations with patient, verbalized understanding.    Please call patient to schedule an appointment for today June 17.

## 2024-06-17 NOTE — PROGRESS NOTES
"[facility] [address], [place], [place]    Assessment/Plan:  No problem-specific Assessment & Plan notes found for this encounter.    Diagnoses and all orders for this visit:    Postpartum hemorrhage, unspecified type  -     CBC and differential; Future  -     TIBC Panel (incl. Iron, TIBC, % Iron Saturation); Future  -     US pelvis complete w transvaginal; Future          Subjective:   Eva Garduno is a 29 y.o.  female.  CC: heavy, vaginal bleeding    HPI: Vaginal Bleeding      Patient presents with complaints of heavy bleeding. She states she stopped bleeding for about 3 weeks after delivery. She did have a PPH with her delivery. She states she has to change her tampon every 1-2 hours and started bleeding Friday and has been heavy since. She states she took Tylenol for cramping.   I d/c her procardia as her BPs have been normal   Denies lightheadedness, dizziness    ROS: Review of Systems   Constitutional: Negative.    HENT: Negative.     Eyes: Negative.    Respiratory: Negative.     Cardiovascular: Negative.    Gastrointestinal: Negative.    Genitourinary:  Positive for vaginal bleeding.   Musculoskeletal: Negative.    All other systems reviewed and are negative.      PFSH: The following portions of the patient's history were reviewed and updated as appropriate: allergies, current medications, past family history, past medical history, obstetric history, gynecologic history, past social history, past surgical history and problem list.       Objective:  /64   Ht 5' 4\" (1.626 m)   Wt 73.3 kg (161 lb 9.6 oz)   LMP 2023   Breastfeeding Unknown   BMI 27.74 kg/m²    Physical Exam  Vitals reviewed.   Constitutional:       Appearance: Normal appearance.   Cardiovascular:      Rate and Rhythm: Normal rate.   Pulmonary:      Effort: Pulmonary effort is normal. No respiratory distress.   Neurological:      Mental Status: She is alert.   Psychiatric:         Mood " and Affect: Mood normal.         Behavior: Behavior normal.

## 2024-06-18 ENCOUNTER — HOSPITAL ENCOUNTER (OUTPATIENT)
Dept: RADIOLOGY | Age: 29
Discharge: HOME | End: 2024-06-18
Payer: COMMERCIAL

## 2024-06-18 ENCOUNTER — APPOINTMENT (OUTPATIENT)
Dept: LAB | Age: 29
End: 2024-06-18
Payer: COMMERCIAL

## 2024-06-18 PROCEDURE — 76830 TRANSVAGINAL US NON-OB: CPT

## 2024-06-18 PROCEDURE — 76856 US EXAM PELVIC COMPLETE: CPT

## 2024-06-19 ENCOUNTER — TELEPHONE (OUTPATIENT)
Age: 29
End: 2024-06-19

## 2024-06-19 NOTE — TELEPHONE ENCOUNTER
----- Message from Trini Tripp MD sent at 6/19/2024 12:21 PM EDT -----  Please call Eva and advise results normal.  There is no evidence of retained tissue or abnormal vascularity with in the uterus.  Please have patient monitor her bleeding and call if it persists or worsens.  Trini Tripp MD

## 2024-06-21 ENCOUNTER — TELEPHONE (OUTPATIENT)
Age: 29
End: 2024-06-21

## 2024-06-21 NOTE — TELEPHONE ENCOUNTER
Patient has been noncompliant in BP monitoring program.  Has not submitted blood pressure readings since 6/19/24.  Called to remind patient to submit readings.  Left reminder message on voicemail to submit BP readings twice daily.

## 2024-06-26 ENCOUNTER — TELEPHONE (OUTPATIENT)
Age: 29
End: 2024-06-26

## 2024-06-26 NOTE — TELEPHONE ENCOUNTER
Patient has been noncompliant in BP monitoring program.  Has not submitted blood pressure readings since 6/24/2024.  Called to remind patient to submit readings.   Spoke to patient.  States she submitted her BP this morning .

## 2024-07-17 ENCOUNTER — TELEPHONE (OUTPATIENT)
Age: 29
End: 2024-07-17

## 2024-07-17 NOTE — TELEPHONE ENCOUNTER
Patient states she has insurance thru her  [employer] ( non [facility] plan) and she was advised to call to check if prior auth was required for IUD. Patient states representative @ [facility] requested office send a letter stating IUD was for pregnancy prevention.

## 2024-07-18 ENCOUNTER — TELEPHONE (OUTPATIENT)
Dept: OBGYN CLINIC | Facility: MEDICAL CENTER | Age: 29
End: 2024-07-18

## 2024-07-18 NOTE — TELEPHONE ENCOUNTER
Left voice mail for patient to inform her that no prior authorization is required for her upcoming IUD appointment and to call our office back with any concerns/questions.

## 2024-07-22 ENCOUNTER — PROCEDURE VISIT (OUTPATIENT)
Dept: OBGYN CLINIC | Facility: MEDICAL CENTER | Age: 29
End: 2024-07-22
Payer: COMMERCIAL

## 2024-07-22 VITALS
WEIGHT: 163 LBS | HEIGHT: 64 IN | SYSTOLIC BLOOD PRESSURE: 118 MMHG | BODY MASS INDEX: 27.83 KG/M2 | DIASTOLIC BLOOD PRESSURE: 70 MMHG

## 2024-07-22 DIAGNOSIS — Z01.812 PRE-PROCEDURE LAB EXAM: ICD-10-CM

## 2024-07-22 DIAGNOSIS — Z30.430 ENCOUNTER FOR INSERTION OF MIRENA IUD: Primary | ICD-10-CM

## 2024-07-22 PROCEDURE — 58300 INSERT INTRAUTERINE DEVICE: CPT | Performed by: OBSTETRICS & GYNECOLOGY

## 2024-07-22 NOTE — PROGRESS NOTES
Iud insertions    Performed by: Yaz Calloway MD  Authorized by: Yaz Calloway MD    Procedure: IUD insertion    Consent obtained by patient, parent, or legal power of  - including discussion of procedure risks and benefits, patient questions answered, and patient education provided.: yes    Pregnancy risk: reasonably certain the patient is not pregnant    Date/Time of Insertion:  7/22/2024 1:40 PM  Immediately prior to procedure a time out was called: yes    Pelvic exam performed: yes    Speculum placed in vagina: yes    Cervix cleaned and prepped: yes    Uterus sound depth (cm):  7  Cervix dilated: no    IUD inserted without complications: yes    IUD type:  1 each levonorgestrel 19.5 MG  Strings trimmed to (cm):  3  Patient tolerated procedure well: yes    Intended removal date: 8 years

## 2024-07-25 ENCOUNTER — OFFICE VISIT (OUTPATIENT)
Dept: INTERNAL MEDICINE CLINIC | Facility: CLINIC | Age: 29
End: 2024-07-25
Payer: COMMERCIAL

## 2024-07-25 VITALS
OXYGEN SATURATION: 98 % | SYSTOLIC BLOOD PRESSURE: 122 MMHG | HEART RATE: 71 BPM | BODY MASS INDEX: 27.64 KG/M2 | WEIGHT: 161 LBS | TEMPERATURE: 98 F | DIASTOLIC BLOOD PRESSURE: 78 MMHG

## 2024-07-25 DIAGNOSIS — K76.89 FOCAL NODULAR HYPERPLASIA OF LIVER: ICD-10-CM

## 2024-07-25 DIAGNOSIS — Z00.00 WELLNESS EXAMINATION: Primary | ICD-10-CM

## 2024-07-25 PROCEDURE — 99395 PREV VISIT EST AGE 18-39: CPT | Performed by: INTERNAL MEDICINE

## 2024-07-25 NOTE — PATIENT INSTRUCTIONS
"Return in 1 year    Patient Education     Routine physical for adults   The Basics   Written by the doctors and editors at [facility]   What is a physical? -- A physical is a routine visit, or \"check-up,\" with your doctor. You might also hear it called a \"wellness visit\" or \"preventive visit.\"  During each visit, the doctor will:   Ask about your physical and mental health   Ask about your habits, behaviors, and lifestyle   Do an exam   Give you vaccines if needed   Talk to you about any medicines you take   Give advice about your health   Answer your questions  Getting regular check-ups is an important part of taking care of your health. It can help your doctor find and treat any problems you have. But it's also important for preventing health problems.  A routine physical is different from a \"sick visit.\" A sick visit is when you see a doctor because of a health concern or problem. Since physicals are scheduled ahead of time, you can think about what you want to ask the doctor.  How often should I get a physical? -- It depends on your age and health. In general, for people age 21 years and older:   If you are younger than 50 years, you might be able to get a physical every 3 years.   If you are 50 years or older, your doctor might recommend a physical every year.  If you have an ongoing health condition, like diabetes or high blood pressure, your doctor will probably want to see you more often.  What happens during a physical? -- In general, each visit will include:   Physical exam - The doctor or nurse will check your height, weight, heart rate, and blood pressure. They will also look at your eyes and ears. They will ask about how you are feeling and whether you have any symptoms that bother you.   Medicines - It's a good idea to bring a list of all the medicines you take to each doctor visit. Your doctor will talk to you about your medicines and answer any questions. Tell them if you are having any side effects " "that bother you. You should also tell them if you are having trouble paying for any of your medicines.   Habits and behaviors - This includes:   Your diet   Your exercise habits   Whether you smoke, drink alcohol, or use drugs   Whether you are sexually active   Whether you feel safe at home  Your doctor will talk to you about things you can do to improve your health and lower your risk of health problems. They will also offer help and support. For example, if you want to quit smoking, they can give you advice and might prescribe medicines. If you want to improve your diet or get more physical activity, they can help you with this, too.   Lab tests, if needed - The tests you get will depend on your age and situation. For example, your doctor might want to check your:   Cholesterol   Blood sugar   Iron level   Vaccines - The recommended vaccines will depend on your age, health, and what vaccines you already had. Vaccines are very important because they can prevent certain serious or deadly infections.   Discussion of screening - \"Screening\" means checking for diseases or other health problems before they cause symptoms. Your doctor can recommend screening based on your age, risk, and preferences. This might include tests to check for:   Cancer, such as breast, prostate, cervical, ovarian, colorectal, prostate, lung, or skin cancer   Sexually transmitted infections, such as chlamydia and gonorrhea   Mental health conditions like depression and anxiety  Your doctor will talk to you about the different types of screening tests. They can help you decide which screenings to have. They can also explain what the results might mean.   Answering questions - The physical is a good time to ask the doctor or nurse questions about your health. If needed, they can refer you to other doctors or specialists, too.  Adults older than 65 years often need other care, too. As you get older, your doctor will talk to you about:   How to " prevent falling at home   Hearing or vision tests   Memory testing   How to take your medicines safely   Making sure that you have the help and support you need at home  All topics are updated as new evidence becomes available and our peer review process is complete.  This topic retrieved from [employer] on: May 02, 2024.  Topic 021346 Version 1.0  Release: 32.4.3 - C32.122  © 2024 UpToDate, Inc. and/or its affiliates. All rights reserved.  Consumer Information Use and Disclaimer   Disclaimer: This generalized information is a limited summary of diagnosis, treatment, and/or medication information. It is not meant to be comprehensive and should be used as a tool to help the user understand and/or assess potential diagnostic and treatment options. It does NOT include all information about conditions, treatments, medications, side effects, or risks that may apply to a specific patient. It is not intended to be medical advice or a substitute for the medical advice, diagnosis, or treatment of a health care provider based on the health care provider's examination and assessment of a patient's specific and unique circumstances. Patients must speak with a health care provider for complete information about their health, medical questions, and treatment options, including any risks or benefits regarding use of medications. This information does not endorse any treatments or medications as safe, effective, or approved for treating a specific patient. UpToDate, Inc. and its affiliates disclaim any warranty or liability relating to this information or the use thereof.The use of this information is governed by the Terms of Use, available at https://www.wolterskluwer.com/en/know/clinical-effectiveness-terms. 2024© UpToDate, Inc. and its affiliates and/or licensors. All rights reserved.  Copyright   © 2024 UpToDate, Inc. and/or its affiliates. All rights reserved.

## 2024-07-25 NOTE — PROGRESS NOTES
Adult Annual Physical  Name: Eva Garduno      : 1995      MRN: [id number]  Encounter Provider: Obed Gage DO  Encounter Date: 2024   Encounter department: [facility]    Assessment & Plan   1. Wellness examination  Comments:  UTD & stable, check BMP/lipid panel in  before annual physical.  2. Focal nodular hyperplasia of liver  Comments:  seeing GI in October to follow up on this    Immunizations and preventive care screenings were discussed with patient today. Appropriate education was printed on patient's after visit summary.    Counseling:  Exercise: the importance of regular exercise/physical activity was discussed. Recommend exercise 3-5 times per week for at least 30 minutes.       Depression Screening and Follow-up Plan: Patient was screened for depression during today's encounter. They screened negative with a PHQ-2 score of 0.        History of Present Illness     Adult Annual Physical:  Patient presents for annual physical.     Diet and Physical Activity:  - Diet/Nutrition: well balanced diet.  - Exercise: no formal exercise. but just purchased a treadmill and is going to start using this    Depression Screening:  - PHQ-2 Score: 0    General Health:  - Sleep: sleeps well.    /GYN Health:  - Follows with GYN: yes.   - Menopause: premenopausal.     Here for annual physical, Eva had a baby daughter born 2 mos ago.  She is no longer nursing and they are both doing well.  She lost 20 lbs since the birth but gained 40 lbs during the pregnancy.  She is eating well and purchased a treadmill to start exercising.  She is UTD on vaccines and will get flu vaccine thru work.  She works in [employer] at [employer].  She has no other questions or concerns today and ROS is otherwise negative.    Review of Systems   Constitutional:  Negative for fever.   HENT:  Negative for congestion.    Eyes:  Negative for visual disturbance.   Respiratory:  Negative for  shortness of breath.    Cardiovascular:  Negative for chest pain.   Gastrointestinal:  Negative for abdominal pain.   Endocrine: Negative for polyuria.   Genitourinary:  Negative for difficulty urinating.   Musculoskeletal:  Negative for gait problem.   Skin:  Negative for rash.   Allergic/Immunologic: Negative for immunocompromised state.   Neurological:  Negative for dizziness.   Psychiatric/Behavioral:  Negative for dysphoric mood.      Current Outpatient Medications on File Prior to Visit   Medication Sig Dispense Refill    [DISCONTINUED] acetaminophen (TYLENOL) 325 mg tablet Take 3 tablets (975 mg total) by mouth every 8 (eight) hours as needed for mild pain      [DISCONTINUED] ibuprofen (MOTRIN) 600 mg tablet Take 1 tablet (600 mg total) by mouth every 6 (six) hours      [DISCONTINUED] Prenatal Vit-Fe Fumarate-FA (PRENATAL VITAMIN PO) Take by mouth      EPINEPHrine (EPIPEN) 0.3 mg/0.3 mL SOAJ Inject 0.3 mL (0.3 mg total) into a muscle once for 1 dose 0.6 mL 0     No current facility-administered medications on file prior to visit.        Objective     /78 (BP Location: Left arm, Patient Position: Sitting, Cuff Size: Standard)   Pulse 71   Temp 98 °F (36.7 °C)   Wt 73 kg (161 lb)   LMP 07/13/2024 (Exact Date)   SpO2 98%   BMI 27.64 kg/m²     Physical Exam  Vitals reviewed.   Constitutional:       General: She is not in acute distress.     Appearance: Normal appearance.   HENT:      Head: Normocephalic and atraumatic.      Right Ear: Tympanic membrane normal.      Left Ear: Tympanic membrane normal.      Mouth/Throat:      Mouth: Mucous membranes are moist.   Eyes:      Conjunctiva/sclera: Conjunctivae normal.   Cardiovascular:      Rate and Rhythm: Normal rate and regular rhythm.      Heart sounds: No murmur heard.  Pulmonary:      Effort: Pulmonary effort is normal.      Breath sounds: No wheezing or rales.   Abdominal:      General: Abdomen is flat. Bowel sounds are normal.      Palpations:  Abdomen is soft.      Tenderness: There is no abdominal tenderness.   Musculoskeletal:      Cervical back: Neck supple.      Right lower leg: No edema.      Left lower leg: No edema.   Neurological:      Mental Status: She is alert. Mental status is at baseline.   Psychiatric:         Mood and Affect: Mood normal.         Behavior: Behavior normal.       Lab Results   Component Value Date    LDLCALC 87 07/19/2022

## 2024-09-17 ENCOUNTER — OFFICE VISIT (OUTPATIENT)
Dept: OBGYN CLINIC | Facility: MEDICAL CENTER | Age: 29
End: 2024-09-17
Payer: COMMERCIAL

## 2024-09-17 VITALS
SYSTOLIC BLOOD PRESSURE: 116 MMHG | HEIGHT: 64 IN | BODY MASS INDEX: 27.31 KG/M2 | WEIGHT: 160 LBS | DIASTOLIC BLOOD PRESSURE: 68 MMHG

## 2024-09-17 DIAGNOSIS — Z97.5 IUD (INTRAUTERINE DEVICE) IN PLACE: ICD-10-CM

## 2024-09-17 DIAGNOSIS — N89.8 VAGINAL DISCHARGE: Primary | ICD-10-CM

## 2024-09-17 PROCEDURE — 87480 CANDIDA DNA DIR PROBE: CPT | Performed by: OBSTETRICS & GYNECOLOGY

## 2024-09-17 PROCEDURE — 99213 OFFICE O/P EST LOW 20 MIN: CPT | Performed by: OBSTETRICS & GYNECOLOGY

## 2024-09-17 PROCEDURE — 87510 GARDNER VAG DNA DIR PROBE: CPT | Performed by: OBSTETRICS & GYNECOLOGY

## 2024-09-17 PROCEDURE — 87660 TRICHOMONAS VAGIN DIR PROBE: CPT | Performed by: OBSTETRICS & GYNECOLOGY

## 2024-09-17 NOTE — PROGRESS NOTES
Assessment Diagnoses and all orders for this visit:    Vaginal discharge  -     Vaginosis DNA Probe    IUD (intrauterine device) in place  IUD  string seen , scant spotting seen   We discussed spotting still normal since placement of  IUD           Subjective   Eva Garduno is a 29 y.o. female here for IUD string check and having some spotting and  odor        Patient Active Problem List   Diagnosis    Focal nodular hyperplasia of liver    Allergic rhinitis    Hemorrhoids    Status post primary low transverse  section    Hypocalcemia       Gynecologic History  No LMP recorded. (Menstrual status: Birth Control).  The current method of family planning is IUD.    Past Medical History:   Diagnosis Date    Eczema     Liver cyst      Past Surgical History:   Procedure Laterality Date    DILATION AND CURETTAGE OF UTERUS N/A 2024    Procedure: SUCTION DILATATION AND CURETTAGE (D&C) WITH BALLON PLACEMENT;  Surgeon: Elaina Amin MD;  [facility];  Service: Gynecology    EXAMINATION UNDER ANESTHESIA N/A 2024    Procedure: EXAM UNDER ANESTHESIA (EUA);  Surgeon: Elaina Amin MD;  [facility];  Service: Gynecology    FRENULECTOMY, LINGUAL      NM  DELIVERY ONLY N/A 2024    Procedure:  SECTION ();  Surgeon: Elaina Amin MD;  [facility];  Service: Obstetrics    NM TONSILLECTOMY & ADENOIDECTOMY AGE 10/> N/A 10/3/2018    Procedure: TONSILLECTOMY;  Surgeon: Bradley Leon MD;  [facility];  Service: ENT    WISDOM TOOTH EXTRACTION       Family History   Problem Relation Age of Onset    Irregular heart beat Mother     Osteoporosis Mother     Hypertension Father     Lymphoma Father     Hypertension Sister     Osteoporosis Maternal Grandmother     Heart disease Maternal Grandfather     Hyperlipidemia Paternal Grandmother     Diabetes Paternal Grandmother     Hyperlipidemia Paternal Grandfather     Hypertension Paternal Grandfather     Breast cancer Neg Hx      Colon cancer Neg Hx     Ovarian cancer Neg Hx      Social History     Socioeconomic History    Marital status: /Civil Union     Spouse name: Not on file    Number of children: Not on file    Years of education: Not on file    Highest education level: Not on file   Occupational History    Not on file   Tobacco Use    Smoking status: Never    Smokeless tobacco: Never   Vaping Use    Vaping status: Never Used   Substance and Sexual Activity    Alcohol use: Not Currently     Comment: socially    Drug use: No    Sexual activity: Yes     Partners: Male     Birth control/protection: None   Other Topics Concern    Not on file   Social History Narrative    Do you have pets? dog Is pet allowed in bedroom?Yes    Are you a smoker? Never    Does anyone smoke in your home? No       Do you live with smokers? No    [place] frequently? No   How many times a year? N/A      Social Determinants of Health     Financial Resource Strain: Not on file   Food Insecurity: Not on file   Transportation Needs: Not on file   Physical Activity: Not on file   Stress: Not on file   Social Connections: Unknown (6/18/2024)    Received from [employer]     How often do you feel lonely or isolated from those around you? (Adult - for ages 18 years and over): Not on file   Intimate Partner Violence: Not on file   Housing Stability: Not on file     No Known Allergies    Current Outpatient Medications:     EPINEPHrine (EPIPEN) 0.3 mg/0.3 mL SOAJ, Inject 0.3 mL (0.3 mg total) into a muscle once for 1 dose, Disp: 0.6 mL, Rfl: 0    Current Facility-Administered Medications:     levonorgestrel (MIRENA) IUD 20 mcg/day, 1 each, Intrauterine Device, Once every 8 years,     Review of Systems  Constitutional :no fever, feels well, no tiredness, no recent weight gain or loss  ENT: no ear ache, no loss of hearing, no nosebleeds or nasal discharge, no sore throat or hoarseness.  Cardiovascular: no complaints of slow or fast heart beat,  "no chest pain, no palpitations, no leg claudication or lower extremity edema.  Respiratory: no complaints of shortness of shortness of breath, no JAVED  Breasts:no complaints of breast pain, breast lump, or nipple discharge  Gastrointestinal: no complaints of abdominal pain, constipation, nausea, vomiting, or diarrhea or bloody stools  Genitourinary : as noted in HPI.  Musculoskeletal: no complaints of arthralgia, no myalgia, no joint swelling or stiffness, no limb pain or swelling.  Integumentary: no complaints of skin rash or lesion, itching or dry skin  Neurological: no complaints of headache, no confusion, no numbness or tingling, no dizziness or fainting     Objective     /68   Ht 5' 4\" (1.626 m)   Wt 72.6 kg (160 lb)   BMI 27.46 kg/m²     General:   appears stated age, cooperative, alert normal mood and affect   Lungs: Unlabored     Abdomen: soft, non-tender, without masses or organomegaly   Vulva: normal, normal female genitalia, Bartholin's, Urethra, [place] normal, no lesions, normal female hair distribution, no clitoral enlargement   Vagina: normal vagina, no discharge, exudate, lesion, or erythema   Urethra: normal   Cervix: Normal, no discharge. IUD strings present.   Uterus: normal size, contour, position, consistency, mobility, non-tender   Adnexa: no mass, fullness, tenderness   Psychiatric orientation to person, place, and time: normal. mood and affect: normal      "

## 2024-09-18 LAB
CANDIDA RRNA VAG QL PROBE: NOT DETECTED
G VAGINALIS RRNA GENITAL QL PROBE: NOT DETECTED
T VAGINALIS RRNA GENITAL QL PROBE: NOT DETECTED

## 2024-10-02 ENCOUNTER — OFFICE VISIT (OUTPATIENT)
Dept: GASTROENTEROLOGY | Facility: CLINIC | Age: 29
End: 2024-10-02
Payer: COMMERCIAL

## 2024-10-02 VITALS
BODY MASS INDEX: 27.25 KG/M2 | HEIGHT: 64 IN | TEMPERATURE: 98.1 F | DIASTOLIC BLOOD PRESSURE: 76 MMHG | SYSTOLIC BLOOD PRESSURE: 100 MMHG | WEIGHT: 159.6 LBS

## 2024-10-02 DIAGNOSIS — K64.9 HEMORRHOIDS, UNSPECIFIED HEMORRHOID TYPE: ICD-10-CM

## 2024-10-02 DIAGNOSIS — K76.89 FOCAL NODULAR HYPERPLASIA OF LIVER: Primary | ICD-10-CM

## 2024-10-02 PROCEDURE — 99214 OFFICE O/P EST MOD 30 MIN: CPT | Performed by: INTERNAL MEDICINE

## 2024-10-02 NOTE — PROGRESS NOTES
[facility] - Outpatient Follow-up Note  Eva Garduno 29 y.o. female MRN: [id number]  Encounter: [id number]          ASSESSMENT AND PLAN:      1. Focal nodular hyperplasia of liver  She most likely has focal nodular hyperplasia in her liver.  It is reassuring that it decreased in size on her last MRI in 2021.  We will repeat her MRI now to assess for a change in the size or appearance.    2. Hemorrhoids, unspecified hemorrhoid type  She has hemorrhoids and I encouraged her to increase her fiber intake, drink plenty of fluids, and get regular exercise to avoid straining.  If she develops recurrent bleeding we could consider a colonoscopy.  If she gets discomfort from the hemorrhoids she can try sitz bath's, Anusol, or Preparation H.    ______________________________________________________________________    SUBJECTIVE: She presents for follow-up of her focal nodular hyperplasia in her liver and her hemorrhoids.  Her last MRI was approximately 3 years ago and at that time there was a slight decrease in the size of her focal nodular hyperplasia and the second smaller nodule in her liver was felt to be a hemangioma.  She has a history of hemorrhoids but has not had any recent bleeding.  These hemorrhoids occasionally cause discomfort.  She denies any diarrhea, constipation, abdominal pain, or weight loss.  She has never previously had a colonoscopy.      REVIEW OF SYSTEMS IS OTHERWISE NEGATIVE.      Historical Information   Past Medical History:   Diagnosis Date    Eczema     Liver cyst      Past Surgical History:   Procedure Laterality Date    DILATION AND CURETTAGE OF UTERUS N/A 5/14/2024    Procedure: SUCTION DILATATION AND CURETTAGE (D&C) WITH BALLON PLACEMENT;  Surgeon: Elaina Amin MD;  [facility];  Service: Gynecology    EXAMINATION UNDER ANESTHESIA N/A 5/14/2024    Procedure: EXAM UNDER ANESTHESIA (EUA);  Surgeon: Elaina Amin MD;  [facility];  Service:  "Gynecology    FRENULECTOMY, LINGUAL      NM  DELIVERY ONLY N/A 2024    Procedure:  SECTION ();  Surgeon: Elaina Amin MD;  [facility] ;  Service: Obstetrics    NM TONSILLECTOMY & ADENOIDECTOMY AGE 10/> N/A 10/3/2018    Procedure: TONSILLECTOMY;  Surgeon: Bradley Leon MD;  [facility];  Service: ENT    WISDOM TOOTH EXTRACTION       Social History   Social History     Substance and Sexual Activity   Alcohol Use Not Currently    Comment: socially     Social History     Substance and Sexual Activity   Drug Use No     Social History     Tobacco Use   Smoking Status Never   Smokeless Tobacco Never     Family History   Problem Relation Age of Onset    Irregular heart beat Mother     Osteoporosis Mother     Hypertension Father     Lymphoma Father     Hypertension Sister     Osteoporosis Maternal Grandmother     Heart disease Maternal Grandfather     Hyperlipidemia Paternal Grandmother     Diabetes Paternal Grandmother     Hyperlipidemia Paternal Grandfather     Hypertension Paternal Grandfather     Breast cancer Neg Hx     Colon cancer Neg Hx     Ovarian cancer Neg Hx        Meds/Allergies       Current Outpatient Medications:     EPINEPHrine (EPIPEN) 0.3 mg/0.3 mL SOAJ    Current Facility-Administered Medications:     levonorgestrel (MIRENA) IUD 20 mcg/day, 1 each, Intrauterine Device, Once every 8 years    No Known Allergies        Objective     Blood pressure 100/76, temperature 98.1 °F (36.7 °C), temperature source Tympanic, height 5' 4\" (1.626 m), weight 72.4 kg (159 lb 9.6 oz), unknown if currently breastfeeding. Body mass index is 27.4 kg/m².      PHYSICAL EXAM:      General Appearance:   Alert, cooperative, no distress   HEENT:   Normocephalic, atraumatic, anicteric.     Neck:  Supple, symmetrical, trachea midline   Lungs:   Clear to auscultation bilaterally; no rales, rhonchi or wheezing; respirations unlabored    Heart::   Regular rate and rhythm; no murmur, rub, or " gallop.   Abdomen:   Soft, non-tender, non-distended; normal bowel sounds; no masses, no organomegaly    Genitalia:   Deferred    Rectal:   Deferred    Extremities:  No cyanosis, clubbing or edema    Pulses:  2+ and symmetric    Skin:  No jaundice, rashes, or lesions    Lymph nodes:  No palpable cervical lymphadenopathy        Lab Results:   No visits with results within 1 Day(s) from this visit.   Latest known visit with results is:   Office Visit on 09/17/2024   Component Date Value    Trichomonas vaginalis 09/17/2024 Not Detected     Gardnerella vaginalis 09/17/2024 Not Detected     Candida Species 09/17/2024 Not Detected          Radiology Results:   No results found.

## 2025-05-12 ENCOUNTER — CONSULT (OUTPATIENT)
Age: 30
End: 2025-05-12
Payer: COMMERCIAL

## 2025-05-12 VITALS
HEIGHT: 64 IN | WEIGHT: 142 LBS | SYSTOLIC BLOOD PRESSURE: 110 MMHG | DIASTOLIC BLOOD PRESSURE: 76 MMHG | BODY MASS INDEX: 24.24 KG/M2

## 2025-05-12 DIAGNOSIS — N62 MACROMASTIA: Primary | ICD-10-CM

## 2025-05-12 PROCEDURE — 99204 OFFICE O/P NEW MOD 45 MIN: CPT | Performed by: STUDENT IN AN ORGANIZED HEALTH CARE EDUCATION/TRAINING PROGRAM

## 2025-05-12 NOTE — PROGRESS NOTES
Name: Eva Garduno      : 1995      MRN: [id number]  Encounter Provider: Savanna Eli DO  Encounter Date: 2025   Encounter department: [facility] [place]  :  Assessment & Plan  Macromastia         We discussed the procedure, risks, benefits, alternatives and postoperative instructions and expectations. I do think the patient would have an functional and physiologic benefit to BBR.  We discussed that weight and hormonal changes can and will have an effect on breast size and contour in the future.  All patient's questions were answered and she voiced understanding.      History of Present Illness   HPI  Eva Garduno is a 30 y.o. female who presents with symptomatic macromastia.  She reports her breasts have always been larger than what she thought was proportional to her body but this was amplified during and after pregnancy.  She has a 1 year old.  Her weight is essentially back to her normal and she is very physically active.  She is not planning to have additional children. She has upper back and neck pain with the ptosis and weight of her breasts.  She endorses sweating beneath them with intertrigo.  She denies nicotine use.  She had a difficult time breast feeding.  She denies a family history of breast CA.    History obtained from: patient    Review of Systems  Medical History Reviewed by provider this encounter:     .  Past Medical History   Past Medical History:   Diagnosis Date    Eczema     Liver cyst      Past Surgical History:   Procedure Laterality Date    DILATION AND CURETTAGE OF UTERUS N/A 2024    Procedure: SUCTION DILATATION AND CURETTAGE (D&C) WITH BALLON PLACEMENT;  Surgeon: Elaina Amin MD;  [facility];  Service: Gynecology    EXAMINATION UNDER ANESTHESIA N/A 2024    Procedure: EXAM UNDER ANESTHESIA (EUA);  Surgeon: Elaina Amin MD;  [facility];  Service: Gynecology    FRENULECTOMY, LINGUAL      CO  " DELIVERY ONLY N/A 2024    Procedure:  SECTION ();  Surgeon: Elaina Amin MD;  [facility] ;  Service: Obstetrics    AL TONSILLECTOMY & ADENOIDECTOMY AGE 10/> N/A 10/3/2018    Procedure: TONSILLECTOMY;  Surgeon: Bradley Leon MD;  [facility];  Service: ENT    WISDOM TOOTH EXTRACTION       Family History   Problem Relation Age of Onset    Irregular heart beat Mother     Osteoporosis Mother     Hypertension Father     Lymphoma Father     Hypertension Sister     Osteoporosis Maternal Grandmother     Heart disease Maternal Grandfather     Hyperlipidemia Paternal Grandmother     Diabetes Paternal Grandmother     Hyperlipidemia Paternal Grandfather     Hypertension Paternal Grandfather     Breast cancer Neg Hx     Colon cancer Neg Hx     Ovarian cancer Neg Hx       reports that she has never smoked. She has never used smokeless tobacco. She reports that she does not currently use alcohol. She reports that she does not use drugs.  Current Outpatient Medications   Medication Instructions    EPINEPHrine (EPIPEN) 0.3 mg, Intramuscular, Once   No Known Allergies   Current Outpatient Medications on File Prior to Visit   Medication Sig Dispense Refill    EPINEPHrine (EPIPEN) 0.3 mg/0.3 mL SOAJ Inject 0.3 mL (0.3 mg total) into a muscle once for 1 dose 0.6 mL 0     Current Facility-Administered Medications on File Prior to Visit   Medication Dose Route Frequency Provider Last Rate Last Admin    levonorgestrel (MIRENA) IUD 20 mcg/day  1 each Intrauterine Device Once every 8 years           Social History     Tobacco Use    Smoking status: Never    Smokeless tobacco: Never   Vaping Use    Vaping status: Never Used   Substance and Sexual Activity    Alcohol use: Not Currently     Comment: socially    Drug use: No    Sexual activity: Yes     Partners: Male     Birth control/protection: None        Objective   /76   Ht 5' 4\" (1.626 m)   Wt 64.4 kg (142 lb)   BMI 24.37 kg/m²    "   Physical Exam  General: NAD, well appearing, AAOx3  HEENT: NCAT, EOMI, MMM, supple  Resp: Nonlabored  Heart: RRR  Abdomen: Soft, ND, NT  Extremities/MSK: no LE edema, no obvious deficits in ROM  Neuro: grossly intact with no obvious deficits  Skin: no obvious lesions or rashes  Breast: no palpable mass, no palpable axillary lymphadenopathy, grade II/III ptosis, n/IMF 16, bra strap grooving, erythema and sequelae of recurrent intertrigo along IMF, minimal axillary excess, enlarged NAC    Administrative Statements   I have spent a total time of 45 minutes in caring for this patient on the day of the visit/encounter including Risks and benefits of tx options, Instructions for management, Patient and family education, Importance of tx compliance, Risk factor reductions, Impressions, Counseling / Coordination of care, and Documenting in the medical record.

## 2025-05-21 ENCOUNTER — PREP FOR PROCEDURE (OUTPATIENT)
Dept: PLASTIC SURGERY | Facility: CLINIC | Age: 30
End: 2025-05-21

## 2025-05-21 DIAGNOSIS — N62 MACROMASTIA: Primary | ICD-10-CM

## 2025-07-11 ENCOUNTER — TELEPHONE (OUTPATIENT)
Age: 30
End: 2025-07-11

## 2025-07-11 NOTE — TELEPHONE ENCOUNTER
LVM for pt to confirm appt for 07/14 with Dr Eli in our [place] office. Advised pt to callback to r/s or cancel this appt if needed.

## 2025-07-14 ENCOUNTER — OFFICE VISIT (OUTPATIENT)
Age: 30
End: 2025-07-14
Payer: COMMERCIAL

## 2025-07-14 DIAGNOSIS — N62 MACROMASTIA: Primary | ICD-10-CM

## 2025-07-14 PROCEDURE — 99214 OFFICE O/P EST MOD 30 MIN: CPT | Performed by: STUDENT IN AN ORGANIZED HEALTH CARE EDUCATION/TRAINING PROGRAM

## 2025-07-24 ENCOUNTER — TELEPHONE (OUTPATIENT)
Age: 30
End: 2025-07-24

## 2025-07-24 NOTE — TELEPHONE ENCOUNTER
"Pt calling in saying that she has a possible ingrown hair under her  incision from , as there is a lump and redness. Pt saying she has been using a \"scrub\" on it and warm packs. Pt sent in [employer] picture, pt is advised to go to PCP to follow up for possible ingrown hair, pt verbalized understanding and stated she at work now but will \"call them later\".   "

## 2025-08-01 ENCOUNTER — APPOINTMENT (OUTPATIENT)
Dept: LAB | Facility: CLINIC | Age: 30
End: 2025-08-01
Attending: STUDENT IN AN ORGANIZED HEALTH CARE EDUCATION/TRAINING PROGRAM
Payer: COMMERCIAL

## 2025-08-01 DIAGNOSIS — N62 MACROMASTIA: ICD-10-CM

## 2025-08-01 LAB
ANION GAP SERPL CALCULATED.3IONS-SCNC: 6 MMOL/L (ref 4–13)
BASOPHILS # BLD AUTO: 0.07 THOUSANDS/ÂΜL (ref 0–0.1)
BASOPHILS NFR BLD AUTO: 1 % (ref 0–1)
BUN SERPL-MCNC: 14 MG/DL (ref 5–25)
CALCIUM SERPL-MCNC: 9.2 MG/DL (ref 8.4–10.2)
CHLORIDE SERPL-SCNC: 105 MMOL/L (ref 96–108)
CO2 SERPL-SCNC: 26 MMOL/L (ref 21–32)
CREAT SERPL-MCNC: 0.68 MG/DL (ref 0.6–1.3)
EOSINOPHIL # BLD AUTO: 0.16 THOUSAND/ÂΜL (ref 0–0.61)
EOSINOPHIL NFR BLD AUTO: 2 % (ref 0–6)
ERYTHROCYTE [DISTWIDTH] IN BLOOD BY AUTOMATED COUNT: 12.5 % (ref 11.6–15.1)
GFR SERPL CREATININE-BSD FRML MDRD: 116 ML/MIN/1.73SQ M
GLUCOSE P FAST SERPL-MCNC: 83 MG/DL (ref 65–99)
HCT VFR BLD AUTO: 42 % (ref 34.8–46.1)
HGB BLD-MCNC: 13.5 G/DL (ref 11.5–15.4)
IMM GRANULOCYTES # BLD AUTO: 0.03 THOUSAND/UL (ref 0–0.2)
IMM GRANULOCYTES NFR BLD AUTO: 0 % (ref 0–2)
LYMPHOCYTES # BLD AUTO: 2.47 THOUSANDS/ÂΜL (ref 0.6–4.47)
LYMPHOCYTES NFR BLD AUTO: 25 % (ref 14–44)
MCH RBC QN AUTO: 29.2 PG (ref 26.8–34.3)
MCHC RBC AUTO-ENTMCNC: 32.1 G/DL (ref 31.4–37.4)
MCV RBC AUTO: 91 FL (ref 82–98)
MONOCYTES # BLD AUTO: 0.92 THOUSAND/ÂΜL (ref 0.17–1.22)
MONOCYTES NFR BLD AUTO: 9 % (ref 4–12)
NEUTROPHILS # BLD AUTO: 6.23 THOUSANDS/ÂΜL (ref 1.85–7.62)
NEUTS SEG NFR BLD AUTO: 63 % (ref 43–75)
NRBC BLD AUTO-RTO: 0 /100 WBCS
PLATELET # BLD AUTO: 371 THOUSANDS/UL (ref 149–390)
PMV BLD AUTO: 10.1 FL (ref 8.9–12.7)
POTASSIUM SERPL-SCNC: 3.9 MMOL/L (ref 3.5–5.3)
RBC # BLD AUTO: 4.63 MILLION/UL (ref 3.81–5.12)
SODIUM SERPL-SCNC: 137 MMOL/L (ref 135–147)
WBC # BLD AUTO: 9.88 THOUSAND/UL (ref 4.31–10.16)

## 2025-08-01 PROCEDURE — 36415 COLL VENOUS BLD VENIPUNCTURE: CPT

## 2025-08-01 PROCEDURE — 80048 BASIC METABOLIC PNL TOTAL CA: CPT

## 2025-08-01 PROCEDURE — 85025 COMPLETE CBC W/AUTO DIFF WBC: CPT

## 2025-08-14 ENCOUNTER — ANESTHESIA EVENT (OUTPATIENT)
Dept: PERIOP | Facility: HOSPITAL | Age: 30
End: 2025-08-14
Payer: COMMERCIAL

## 2025-08-14 ENCOUNTER — ANESTHESIA (OUTPATIENT)
Dept: PERIOP | Facility: HOSPITAL | Age: 30
End: 2025-08-14
Payer: COMMERCIAL

## 2025-08-14 ENCOUNTER — HOSPITAL ENCOUNTER (OUTPATIENT)
Facility: HOSPITAL | Age: 30
Setting detail: OUTPATIENT SURGERY
Discharge: HOME | End: 2025-08-14
Attending: STUDENT IN AN ORGANIZED HEALTH CARE EDUCATION/TRAINING PROGRAM | Admitting: STUDENT IN AN ORGANIZED HEALTH CARE EDUCATION/TRAINING PROGRAM
Payer: COMMERCIAL

## 2025-08-15 ENCOUNTER — TELEPHONE (OUTPATIENT)
Age: 30
End: 2025-08-15

## 2025-08-21 ENCOUNTER — OFFICE VISIT (OUTPATIENT)
Age: 30
End: 2025-08-21

## 2025-08-21 ENCOUNTER — OFFICE VISIT (OUTPATIENT)
Dept: INTERNAL MEDICINE CLINIC | Facility: OTHER | Age: 30
End: 2025-08-21
Payer: COMMERCIAL

## 2025-08-21 VITALS
BODY MASS INDEX: 23.35 KG/M2 | HEIGHT: 64 IN | OXYGEN SATURATION: 98 % | DIASTOLIC BLOOD PRESSURE: 74 MMHG | SYSTOLIC BLOOD PRESSURE: 118 MMHG | HEART RATE: 74 BPM | WEIGHT: 136.8 LBS

## 2025-08-21 DIAGNOSIS — Z13.220 SCREENING FOR LIPID DISORDERS: ICD-10-CM

## 2025-08-21 DIAGNOSIS — Z00.00 ANNUAL PHYSICAL EXAM: Primary | ICD-10-CM

## 2025-08-21 DIAGNOSIS — N62 MACROMASTIA: ICD-10-CM

## 2025-08-21 DIAGNOSIS — Z13.1 SCREENING FOR DIABETES MELLITUS: ICD-10-CM

## 2025-08-21 DIAGNOSIS — E55.9 VITAMIN D DEFICIENCY: ICD-10-CM

## 2025-08-21 PROBLEM — E83.51 HYPOCALCEMIA: Status: RESOLVED | Noted: 2024-05-15 | Resolved: 2025-08-21

## 2025-08-21 PROCEDURE — 99395 PREV VISIT EST AGE 18-39: CPT | Performed by: NURSE PRACTITIONER

## 2025-08-21 PROCEDURE — 99024 POSTOP FOLLOW-UP VISIT: CPT

## 2025-08-21 RX ORDER — GABAPENTIN 100 MG/1
100 CAPSULE ORAL 3 TIMES DAILY PRN
Qty: 90 CAPSULE | Refills: 0 | Status: SHIPPED | OUTPATIENT
Start: 2025-08-21 | End: 2025-09-20

## 2025-08-21 RX ORDER — ACETAMINOPHEN 500 MG
500 TABLET ORAL AS NEEDED
COMMUNITY

## (undated) DEVICE — DRAPE EQUIPMENT RF WAND

## (undated) DEVICE — SUT VICRYL 3-0 CT-1 36 IN J944H

## (undated) DEVICE — GLOVE SRG BIOGEL ECLIPSE 6.5

## (undated) DEVICE — TUBING SUCTION 5MM X 12 FT

## (undated) DEVICE — UNDER BUTTOCKS DRAPE W/FLUID CONTROL POUCH: Brand: CONVERTORS

## (undated) DEVICE — SKIN MARKER DUAL TIP WITH RULER CAP, FLEXIBLE RULER AND LABELS: Brand: DEVON

## (undated) DEVICE — 2000CC GUARDIAN II: Brand: GUARDIAN

## (undated) DEVICE — CATH BALLOON BAKRI TAMPONADE 24 FR 58CM RAPID INSTALL

## (undated) DEVICE — CURETTE VACURETTE CRVD 10MM

## (undated) DEVICE — COLLECTION SET, DISPOSABLE WITH HANDLE AND TAPERED FITTINGS TUBING, 6 FT (183 CM): Brand: GYRUS ACMI

## (undated) DEVICE — PREMIUM DRY TRAY LF: Brand: MEDLINE INDUSTRIES, INC.

## (undated) DEVICE — BAG URINE DRAINAGE 2000ML ANTI RFLX LF

## (undated) DEVICE — STRL ALLENTOWN HYSTEROSCOPY PK: Brand: CARDINAL HEALTH

## (undated) DEVICE — GLOVE PI ULTRA TOUCH SZ.6.5

## (undated) DEVICE — ADHESIVE SKIN HIGH VISCOSITY EXOFIN 1ML

## (undated) DEVICE — BETHLEHEM UNIVERSAL MINOR VAG: Brand: CARDINAL HEALTH

## (undated) DEVICE — PVC URETHRAL CATHETER: Brand: DOVER

## (undated) DEVICE — SCD SEQUENTIAL COMPRESSION COMFORT SLEEVE MEDIUM KNEE LENGTH: Brand: KENDALL SCD

## (undated) DEVICE — SUT CHROMIC 0 CT-1 27 IN 812H

## (undated) DEVICE — MEDI-VAC YANKAUER SUCTION HANDLE W/BULBOUS AND CONTROL VENT: Brand: CARDINAL HEALTH

## (undated) DEVICE — CHLORAPREP HI-LITE 10.5ML ORANGE

## (undated) DEVICE — GLOVE INDICATOR PI UNDERGLOVE SZ 6.5 BLUE

## (undated) DEVICE — SUT VICRYL 0 CTX 36 IN J978H

## (undated) DEVICE — SUT MONOCRYL 4-0 PS-2 27 IN Y426H

## (undated) DEVICE — KIT,BETHLEHEM C SECT DELIVERY: Brand: CARDINAL HEALTH

## (undated) DEVICE — SUT VICRYL 0 CT-1 36 IN J946H

## (undated) DEVICE — INTENDED FOR TISSUE SEPARATION, AND OTHER PROCEDURES THAT REQUIRE A SHARP SURGICAL BLADE TO PUNCTURE OR CUT.: Brand: BARD-PARKER SAFETY BLADES SIZE 15, STERILE

## (undated) DEVICE — PENCIL ELECTROSURG E-Z CLEAN -0035H